# Patient Record
Sex: FEMALE | Race: WHITE | NOT HISPANIC OR LATINO | Employment: FULL TIME | URBAN - METROPOLITAN AREA
[De-identification: names, ages, dates, MRNs, and addresses within clinical notes are randomized per-mention and may not be internally consistent; named-entity substitution may affect disease eponyms.]

---

## 2018-02-16 ENCOUNTER — TELEPHONE (OUTPATIENT)
Dept: GASTROENTEROLOGY | Facility: CLINIC | Age: 33
End: 2018-02-16

## 2018-02-16 NOTE — TELEPHONE ENCOUNTER
Spoke with patient and advised patient we did receive her records and referral.    Our next available is late April early may.    Patient verbalizes understanding and is on the cancellation list if any cancellations appear.

## 2018-02-16 NOTE — TELEPHONE ENCOUNTER
----- Message from Tiff Roberts sent at 2/16/2018  8:24 AM CST -----  Contact: Pt #[phone]  Pt states she been calling and calling for (5) weeks to scheduled an Appt and would like to speak to the nurse #[phone]

## 2018-03-15 ENCOUNTER — TELEPHONE (OUTPATIENT)
Dept: GASTROENTEROLOGY | Facility: CLINIC | Age: 33
End: 2018-03-15

## 2018-03-15 NOTE — TELEPHONE ENCOUNTER
Attempted to contact patient without success.    Left message for patient to return call to the clinic to set up new patient appointment with Dr. Overton.

## 2018-03-15 NOTE — TELEPHONE ENCOUNTER
Spoke with patient and set up new patient appointment.    Patient confirmed appointment and reminder mailed out.

## 2018-04-09 ENCOUNTER — TELEPHONE (OUTPATIENT)
Dept: GASTROENTEROLOGY | Facility: CLINIC | Age: 33
End: 2018-04-09

## 2018-04-09 NOTE — TELEPHONE ENCOUNTER
----- Message from Veronica Bryant sent at 4/9/2018  3:10 PM CDT -----  Contact: pt#[phone]  Pt wants to know if medical records was received and also states that her appt is suppose to be with Dr Overton. Please call

## 2018-04-09 NOTE — PROGRESS NOTES
[facility] Consultation Note    Reason for Consult:    Chief Complaint   Patient presents with    Heartburn    Dysphagia    Emesis    Nausea    Abdominal Pain    Bloated    Gas    Diarrhea    Rectal Bleeding         PCP:   [facility] No   [address], [address] / [place] [address]    Referring MD:  Abdelrahman Yates Md  [address], [address]  [place] [address]      HPI:  Lou Lucio is a 33 y.o. female with a PMH of HTN referred to motility clinic for second opinion regarding the following problems:    GERD.  Patient reports bothersome heartburn  Retrosternal pyrosis:yes  Regurgitation:yes.   Onset: 2006 with worsening in 2012  Frequency: regurgitation daily; worse with bending over, pyrosis few days weekly  Improve with: h/o prevacid, prilosec lost efficacy, Dexilant x 2 weeks w/o improvement. Currently taking protonix 80 mg every am and Zantac 300 mg BID x 1 year. Some improvement after STRETTA (6/2017) on meds. Takes carafate few nights weekly with some improvement.  Upright symptoms: yes  Nocturnal symptoms:  yes  Hoarseness:yes  Cough:yes  Throat clearing:yes  Food Triggers:none in particular  Caffeine intake:avoids  Sleeps propped on two pillows  Avoids eating prior to bedtime.      Globus sensation. In throat. Constant. Since 6/2017 after STRETTA. Worse off PPI/H2RA. No dysphagia.     Nausea.  No early satiety  Frequency:several days weekly  Onset: 2012    Vomiting  Frequency:rare; was few days weekly in 1/2018 d/t off PPIs  Onset:2012  Timing of vomiting:  Within 30 min of eating   Within 3 hours after eating:        Cyclical episodes of n/v with symptom free intervals between episodes: 3-4 times in the last 2 years with 12-24 hours of n/v 2-3 times/12-15 diarrheaBMs/abd pain.last episode earlier this month.   History of migraines:yes. Well controlled with relpax PRN  Marijuana use:no  Unusual bathing behaviors:no    Some improvement with  protonix 80 mg daily and Zantac 300 mg BID  Has not tried zofran, phenergan, compazine, reglan, domperidone, scopolamine patch.    Abdominal pain. Reports abdominal pain. Was d/x with IBS in 2012. Treated for c diff in 2012  Character:cramping  Location:generalized  Frequency:  Few days weekly  Duration:30 min - 1 hour  Onset:5432-9654. Improvement in 2013 after d/c BC pills  Worse with:meals. Prior to diarrhea BM  Improves with:BM  Associated with Bm:yes  Nocturnal pain: rare  Some improvement with levsin PRN in 2012  Has not tried with Bentyl, Levbid, IBgurd.  Antidepressants:h/o amitriptyline x 6 weeks last year. D/c d/t sedation  Using narcotic pain medication: no     Gas and bloating.   Bloating: yes  Excessive gas: yes  Abdominal distension: no  Belching:yes   Symptoms get worse after meals:yes  Symptoms get worse as the day progresses: yes   Consumes lactose:yes  Consumes artificial sugars:no    Diarrhea. Intermittent. Few times monthly. 1-2 bristol type 6, or rare bristol type 7 BM/day. Really severe diarrhea x 3-4 in the last 2 years. Nocturnal stools during severe episodes. Stool seepage when passing gas 3-4 times when having diarrhea.  Some improvement with levsin or pepto bismol. Usually with Ambrose type 4 BM 3 times daily.    H/o c diff in 2012. After abx course for salmonella. Treated with PO abx. Resolved.     Blood in stool. Brbpr. Few days weekly since 2015. Small amt. coating outside of stool and on TP. feels external hemorrhoid at times.    Migraines. Takes relpax 40 mg PRN. Once to twice monthly. H/o Bell's palsy in 2013. treated with lyrica and steroids x few weeks. Seeing neurology since 2008.    Anxiety. Depression. Due to GI s/s. Also with stressful job. Has never seen psych.    Denies dysphagia,  early satiety, constipation,  melena, weight loss, trouble sleeping    Total visit time was 90 minutes, more than 50% of which was spent in face-to-face counseling with patient regarding symptoms,  diagnostic results, prognosis, risks and benefits of treatment options, instructions for management, importance of compliance with chosen treatment options, risk factor reduction, stress reduction, coping strategies.      Previous Studies:   EGD 6/9/17: NL esophagus. STRETTA. Small HH. NL stomach. NL duodenum.   EGD 2/17/17:ringed esophagus and feline appearence in middle third esophagus (no tissue). Diffuse mild gastric erythema (-). Nl duodenum.  Esophageal manometry 3/9/17: distal esophageal spasm, no EGJ outflow obstruction.  IRP NL 10.9; DCI NL 1840.2; DL -2.5?; 8% failed swallows, 8% pan esophageal pressurization, 58% premature contractions, no HH.  Personally reviewed by Dr. Overton   PH impedence off meds 3/9/17:  Elevated DeMester score (47.8) indicates severe reflux. 13.5% time in reflux: 24 % upright, 3.3 % supine, 13.5% postprandial.  SI/SAP: heartburn 100.0/99.9; CP 96.2/100.0; regur 96.4/100.0; belch 100.0/98.6;  100% proximal esophageal reflux.  Personally reviewed by Dr. Overton   *EGD/colon 2012:pt reports c.diff. normal procedures.    *per referring provider note/pt report      ROS:  ROS   Constitutional: No fevers, no chills, no night sweats, no weight loss  ENT: No congestion, + rhinorrhea, + chronic sinus problems  CV: No chest pain, no palpitations  Pulm: + cough, no shortness of breath  Ophtho: No blurry vision, no eye redness  GI: see HPI  Derm: No rash  Heme: No lymphadenopathy, no bruising  MSK: No arthritis, no joint swelling, no Raynauds  : No dysuria, + frequent urination, no blood in urine  Endo: No hot or cold intolerance  Neuro: No dizziness, no syncope, no seizure  Psych: No anxiety, no depression        Medical History:   Past Medical History:   Diagnosis Date    C. difficile colitis     GERD (gastroesophageal reflux disease)     Hypertension     Irritable bowel syndrome     Migraines         Surgical History:   Past Surgical History:   Procedure Laterality Date    COLONOSCOPY   "2012    ESOPHAGOGASTRODUODENOSCOPY  2017        Family History:   Family History   Problem Relation Age of Onset    Lung cancer Maternal Grandfather     Esophageal cancer Paternal Grandfather 50     smoker, drinker    Colon cancer Neg Hx     Crohn's disease Neg Hx     Inflammatory bowel disease Neg Hx     Irritable bowel syndrome Neg Hx     Liver cancer Neg Hx     Rectal cancer Neg Hx     Stomach cancer Neg Hx     Celiac disease Neg Hx         Social History:   Social History     Social History    Marital status:      Spouse name: N/A    Number of children: N/A    Years of education: N/A     Social History Main Topics    Smoking status: Never Smoker    Smokeless tobacco: Never Used    Alcohol use Yes      Comment: less than weekly    Drug use: No    Sexual activity: Not Asked     Other Topics Concern    None     Social History Narrative    None        Review of patient's allergies indicates:  Allergies not on file    Current Outpatient Prescriptions   Medication Sig Dispense Refill    BYSTOLIC 5 mg Tab TK 1 T PO QD IN THE EMEKA  5    CARAFATE 100 mg/mL suspension SHAKE LQ AND TK 10 ML PO QID PRN  1    eletriptan (RELPAX) 40 MG tablet Take 40 mg by mouth as needed. may repeat in 2 hours if necessary      ranitidine (ZANTAC) 150 MG tablet Take 150 mg by mouth 2 (two) times daily. Takes two twice daily      cholestyramine (QUESTRAN) 4 gram packet Take 1 packet (4 g total) by mouth 4 (four) times daily as needed. 120 packet 6    ondansetron (ZOFRAN) 8 MG tablet Take 1 tablet (8 mg total) by mouth every 8 (eight) hours as needed for Nausea. 90 tablet 3    pantoprazole (PROTONIX) 40 MG tablet Take 1 tablet (40 mg total) by mouth 2 (two) times daily. 60 tablet 5     No current facility-administered medications for this visit.         Objective Findings:  Vital Signs:  /88   Pulse 76   Ht 5' 6" (1.676 m)   Wt 94.8 kg (209 lb)   BMI 33.73 kg/m²   Body mass index is 33.73 " kg/m².    Physical Exam:  General appearance: alert, cooperative, no distress  HENT: Normocephalic, atraumatic, neck symmetrical, no nasal discharge  Eyes: conjunctivae/corneas clear, PERRL, EOM's intact  Lungs: clear to auscultation bilaterally, no dullness to percussion bilaterally  Heart: regular rate and rhythm without rub; no displacement of the PMI  Abdomen: soft, non-tender; bowel sounds normoactive; no organomegaly  Extremities: extremities symmetric; no clubbing, cyanosis, or edema  Integument: Skin color, texture, turgor normal; no rashes; hair distrubution normal  Neurologic: Alert and oriented X 3, normal strength, normal coordination and gait  Psychiatric: no pressured speech; normal affect; no evidence of impaired cognition    Labs:  Lab Results   Component Value Date    WBC 15.12 (H) 04/10/2018    HGB 13.6 04/10/2018    HCT 40.6 04/10/2018    MCV 91 04/10/2018     (H) 04/10/2018     No results found for: FERRITIN  Lab Results   Component Value Date     04/10/2018    K 3.8 04/10/2018     04/10/2018    CO2 25 04/10/2018    GLU 91 04/10/2018    BUN 10 04/10/2018    CREATININE 0.9 04/10/2018    CALCIUM 9.5 04/10/2018    PROT 7.2 04/10/2018    ALBUMIN 4.1 04/10/2018    BILITOT 0.6 04/10/2018    ALKPHOS 104 04/10/2018    AST 28 04/10/2018    ALT 30 04/10/2018     Lab Results   Component Value Date    TSH 2.313 04/10/2018     No results found for: SEDRATE  No results found for: CRP  No results found for: LABA1C, HGBA1C        Assessment and Plan:  Lou Lucio is a 33 y.o. female with a PMH of HTN referred to motility clinic for second opinion regarding the following problems:    GERD.  Hiatal hernia. Ph impedence off PPIs with severe reflux.  Stretta in 6/2017.   Very mild improvement after Stretta, unable to wean off PPI  Prevacid and omeprazole lost efficacy.  No improvement with Dexilant.  -Cont carafate PRN  -Protonix 40 mg BID.  -Zantac 300 mg PRN.  -pH impedence on PPI BID  -EGD w/  e/g/d biopsies and pH level   -Will consider checking gastrin pending above   -Will consider surgical referral for Nissen fundoplication     Globus sensation.  No dysphagia. Ringed esophagus on previous EGD.  Distal esophageal spasm    -EGD   -Esophageal manometry  -Check IgE level, CBC w diff    Nausea.  Rare vomiting. 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   -EGD  -Esophageal manometry r/o rumination  -Discussed etiology, pathophysiology, evaluation and treatment of rumination syndrome.  Provided handout     -GES  -Check labs  -Zofran 8 mg q8hr PRN    Abdominal pain. IBS diagnosed in 2012.  Unable to tolerate amitriptyline 10 mg daily x 6 weeks  Some improvement with levsin PRN  Has not tried Bentyl, Levbid, IBgurd.  -CT abd/pelvis  -Check labs    Recurrent episodes of nausea, vomiting, abdominal pain and severe diarrhea lasting a day (3-4 times in the past 2 years).  -Check LTS, lipase, US during episode    Gas and bloating. Distention. Belching.  Avoids artificial sugars.  -eliminate lactose  -Will consider SIBO treatment     Diarrhea. Few times monthly. Few episodes of severe diarrhea with nocturnal symptoms and incontinence.  -Questran 1 g QID PRN  -EGD w celiac bx   -Colonoscopy with r/l bx  -labs     H/o salmonella and c diff in 2012. Treated with PO abx with complete resolution    Blood in stool.   -Colonoscopy     Migraines.   Well controlled with relpax PRN.  Followed by neurology     Anxiety. Depression.  Stress.  Mostly related to GI symptoms. Works as an    Has never seen psychiatry nor psychology.   -Discussed the benefit of psychosocial therapy in management of functional GI disorders.  Provided handout.  -Referal to psychology (Dr. Regina Dash) for CBT/mindfulness based therapy/stress reduction training.     Follow-up in about 2 months (around 6/10/2018).    1. Gastroesophageal reflux disease without esophagitis    2. Nausea and vomiting, intractability of vomiting not  specified, unspecified vomiting type    3. Globus sensation    4. Generalized abdominal pain    5. Abdominal bloating    6. Intermittent diarrhea    7. BRBPR (bright red blood per rectum)    8. Anxiety and depression          Order summary:  Orders Placed This Encounter    NM Gastric Emptying    CT Abdomen Pelvis W Wo Contrast    CBC auto differential    Comprehensive metabolic panel    TSH    TISSUE TRANSGLUTAMINASE (TTG), IGA    IgA    IgE    Ambulatory Referral to Psychology    ondansetron (ZOFRAN) 8 MG tablet    cholestyramine (QUESTRAN) 4 gram packet    pantoprazole (PROTONIX) 40 MG tablet    Case request GI: Colonoscopy, ESOPHAGOGASTRODUODENOSCOPY (EGD)    Case request GI: 24 HOUR PH WITH IMPEDANCE (ON REFLUX MED), MANOMETRY-ESOPHAGEAL-WITH IMPEDANCE         Thank you so much for allowing me to participate in the care of ENRICO Kaur, FNP-C  Sirisha Overton MD

## 2018-04-09 NOTE — TELEPHONE ENCOUNTER
Spoke with patient and advised we did get her records.    Patient was also advised she will see both Andreea and Dr. Overton.    Eliel obtains the history and reviewing of records then Dr. Overton comes in and goes over everything and physical exam also plan of care.    Patient verbalizes understanding.

## 2018-04-10 ENCOUNTER — LAB VISIT (OUTPATIENT)
Dept: LAB | Facility: HOSPITAL | Age: 33
End: 2018-04-10
Payer: COMMERCIAL

## 2018-04-10 ENCOUNTER — OFFICE VISIT (OUTPATIENT)
Dept: GASTROENTEROLOGY | Facility: CLINIC | Age: 33
End: 2018-04-10
Payer: COMMERCIAL

## 2018-04-10 ENCOUNTER — TELEPHONE (OUTPATIENT)
Dept: ENDOSCOPY | Facility: HOSPITAL | Age: 33
End: 2018-04-10

## 2018-04-10 VITALS
HEIGHT: 66 IN | DIASTOLIC BLOOD PRESSURE: 88 MMHG | BODY MASS INDEX: 33.59 KG/M2 | WEIGHT: 209 LBS | HEART RATE: 76 BPM | SYSTOLIC BLOOD PRESSURE: 123 MMHG

## 2018-04-10 DIAGNOSIS — K21.9 GASTROESOPHAGEAL REFLUX DISEASE WITHOUT ESOPHAGITIS: ICD-10-CM

## 2018-04-10 DIAGNOSIS — K21.9 GASTROESOPHAGEAL REFLUX DISEASE WITHOUT ESOPHAGITIS: Primary | ICD-10-CM

## 2018-04-10 DIAGNOSIS — R19.7 INTERMITTENT DIARRHEA: ICD-10-CM

## 2018-04-10 DIAGNOSIS — R10.84 GENERALIZED ABDOMINAL PAIN: ICD-10-CM

## 2018-04-10 DIAGNOSIS — R09.A2 GLOBUS SENSATION: ICD-10-CM

## 2018-04-10 DIAGNOSIS — F32.A ANXIETY AND DEPRESSION: ICD-10-CM

## 2018-04-10 DIAGNOSIS — K62.5 BRBPR (BRIGHT RED BLOOD PER RECTUM): ICD-10-CM

## 2018-04-10 DIAGNOSIS — R11.2 NAUSEA AND VOMITING, INTRACTABILITY OF VOMITING NOT SPECIFIED, UNSPECIFIED VOMITING TYPE: ICD-10-CM

## 2018-04-10 DIAGNOSIS — R14.0 ABDOMINAL BLOATING: ICD-10-CM

## 2018-04-10 DIAGNOSIS — F41.9 ANXIETY AND DEPRESSION: ICD-10-CM

## 2018-04-10 LAB
ALBUMIN SERPL BCP-MCNC: 4.1 G/DL
ALP SERPL-CCNC: 104 U/L
ALT SERPL W/O P-5'-P-CCNC: 30 U/L
ANION GAP SERPL CALC-SCNC: 8 MMOL/L
AST SERPL-CCNC: 28 U/L
BASOPHILS # BLD AUTO: 0.08 K/UL
BASOPHILS NFR BLD: 0.5 %
BILIRUB SERPL-MCNC: 0.6 MG/DL
BUN SERPL-MCNC: 10 MG/DL
CALCIUM SERPL-MCNC: 9.5 MG/DL
CHLORIDE SERPL-SCNC: 105 MMOL/L
CO2 SERPL-SCNC: 25 MMOL/L
CREAT SERPL-MCNC: 0.9 MG/DL
DIFFERENTIAL METHOD: ABNORMAL
EOSINOPHIL # BLD AUTO: 1.2 K/UL
EOSINOPHIL NFR BLD: 8.1 %
ERYTHROCYTE [DISTWIDTH] IN BLOOD BY AUTOMATED COUNT: 12.2 %
EST. GFR  (AFRICAN AMERICAN): >60 ML/MIN/1.73 M^2
EST. GFR  (NON AFRICAN AMERICAN): >60 ML/MIN/1.73 M^2
GLUCOSE SERPL-MCNC: 91 MG/DL
HCT VFR BLD AUTO: 40.6 %
HGB BLD-MCNC: 13.6 G/DL
IGA SERPL-MCNC: 146 MG/DL
IGE SERPL-ACNC: 160 IU/ML
IMM GRANULOCYTES # BLD AUTO: 0.07 K/UL
IMM GRANULOCYTES NFR BLD AUTO: 0.5 %
LYMPHOCYTES # BLD AUTO: 3.1 K/UL
LYMPHOCYTES NFR BLD: 20.6 %
MCH RBC QN AUTO: 30.6 PG
MCHC RBC AUTO-ENTMCNC: 33.5 G/DL
MCV RBC AUTO: 91 FL
MONOCYTES # BLD AUTO: 0.8 K/UL
MONOCYTES NFR BLD: 5.2 %
NEUTROPHILS # BLD AUTO: 9.8 K/UL
NEUTROPHILS NFR BLD: 65.1 %
NRBC BLD-RTO: 0 /100 WBC
PLATELET # BLD AUTO: 439 K/UL
PMV BLD AUTO: 8.3 FL
POTASSIUM SERPL-SCNC: 3.8 MMOL/L
PROT SERPL-MCNC: 7.2 G/DL
RBC # BLD AUTO: 4.45 M/UL
SODIUM SERPL-SCNC: 138 MMOL/L
TSH SERPL DL<=0.005 MIU/L-ACNC: 2.31 UIU/ML
WBC # BLD AUTO: 15.12 K/UL

## 2018-04-10 PROCEDURE — 82784 ASSAY IGA/IGD/IGG/IGM EACH: CPT

## 2018-04-10 PROCEDURE — 83516 IMMUNOASSAY NONANTIBODY: CPT

## 2018-04-10 PROCEDURE — 80053 COMPREHEN METABOLIC PANEL: CPT

## 2018-04-10 PROCEDURE — 85025 COMPLETE CBC W/AUTO DIFF WBC: CPT

## 2018-04-10 PROCEDURE — 84443 ASSAY THYROID STIM HORMONE: CPT

## 2018-04-10 PROCEDURE — 99205 OFFICE O/P NEW HI 60 MIN: CPT | Mod: S$GLB,,, | Performed by: NURSE PRACTITIONER

## 2018-04-10 PROCEDURE — 36415 COLL VENOUS BLD VENIPUNCTURE: CPT

## 2018-04-10 PROCEDURE — 99999 PR PBB SHADOW E&M-EST. PATIENT-LVL V: CPT | Mod: PBBFAC,,, | Performed by: NURSE PRACTITIONER

## 2018-04-10 PROCEDURE — 82785 ASSAY OF IGE: CPT

## 2018-04-10 RX ORDER — CHOLESTYRAMINE 4 G/9G
4 POWDER, FOR SUSPENSION ORAL
Qty: 90 PACKET | Refills: 6 | Status: SHIPPED | OUTPATIENT
Start: 2018-04-10 | End: 2018-04-10 | Stop reason: SDUPTHER

## 2018-04-10 RX ORDER — PANTOPRAZOLE SODIUM 40 MG/1
TABLET, DELAYED RELEASE ORAL
Refills: 3 | COMMUNITY
Start: 2018-03-26 | End: 2018-04-10

## 2018-04-10 RX ORDER — ELETRIPTAN HYDROBROMIDE 40 MG/1
40 TABLET, FILM COATED ORAL
COMMUNITY
End: 2021-11-03

## 2018-04-10 RX ORDER — ONDANSETRON HYDROCHLORIDE 8 MG/1
8 TABLET, FILM COATED ORAL EVERY 8 HOURS PRN
Qty: 90 TABLET | Refills: 3 | Status: SHIPPED | OUTPATIENT
Start: 2018-04-10 | End: 2024-03-12

## 2018-04-10 RX ORDER — PANTOPRAZOLE SODIUM 40 MG/1
40 TABLET, DELAYED RELEASE ORAL 2 TIMES DAILY
Qty: 60 TABLET | Refills: 5 | Status: SHIPPED | OUTPATIENT
Start: 2018-04-10 | End: 2018-08-15

## 2018-04-10 RX ORDER — NEBIVOLOL HYDROCHLORIDE 5 MG/1
TABLET ORAL
Refills: 5 | COMMUNITY
Start: 2018-03-26

## 2018-04-10 RX ORDER — CHOLESTYRAMINE 4 G/9G
4 POWDER, FOR SUSPENSION ORAL 4 TIMES DAILY PRN
Qty: 120 PACKET | Refills: 6 | Status: SHIPPED | OUTPATIENT
Start: 2018-04-10 | End: 2018-07-05

## 2018-04-10 RX ORDER — SUCRALFATE 1 G/10ML
SUSPENSION ORAL
Refills: 1 | COMMUNITY
Start: 2018-02-23 | End: 2018-08-15

## 2018-04-10 NOTE — LETTER
April 10, 2018      Abdelrahman Yates MD  [address], [address]  [place] [address]           [facility]  [address]  [place] [address]  Phone: [phone]  Fax: [phone]          Patient: Lou Lucio   MR Number: [id number]   YOB: 1985   Date of Visit: 4/10/2018       Dear Dr. Abdelrahman Yates:    Thank you for referring Lou Lucio to me for evaluation. Attached you will find relevant portions of my assessment and plan of care.    If you have questions, please do not hesitate to call me. I look forward to following Lou Lucio along with you.    Sincerely,    Sirisha Overton MD    Enclosure  CC:  No Recipients    If you would like to receive this communication electronically, please contact [email or web address] or [phone] to request more information on [employer] Link access.    For providers and/or their staff who would like to refer a patient to [facility], please contact us through our one-stop-shop provider referral line, [facility], at [phone].    If you feel you have received this communication in error or would no longer like to receive these types of communications, please e-mail [email or web address]

## 2018-04-11 DIAGNOSIS — Z12.11 SPECIAL SCREENING FOR MALIGNANT NEOPLASMS, COLON: Primary | ICD-10-CM

## 2018-04-11 LAB — TTG IGA SER IA-ACNC: 3 UNITS

## 2018-04-11 RX ORDER — POLYETHYLENE GLYCOL 3350, SODIUM SULFATE ANHYDROUS, SODIUM BICARBONATE, SODIUM CHLORIDE, POTASSIUM CHLORIDE 236; 22.74; 6.74; 5.86; 2.97 G/4L; G/4L; G/4L; G/4L; G/4L
4 POWDER, FOR SOLUTION ORAL ONCE
Qty: 4000 ML | Refills: 0 | Status: SHIPPED | OUTPATIENT
Start: 2018-04-11 | End: 2018-04-11

## 2018-04-12 ENCOUNTER — TELEPHONE (OUTPATIENT)
Dept: GASTROENTEROLOGY | Facility: CLINIC | Age: 33
End: 2018-04-12

## 2018-04-14 ENCOUNTER — HOSPITAL ENCOUNTER (OUTPATIENT)
Dept: RADIOLOGY | Facility: HOSPITAL | Age: 33
Discharge: HOME | End: 2018-04-14
Attending: NURSE PRACTITIONER
Payer: COMMERCIAL

## 2018-04-14 DIAGNOSIS — K21.9 GASTROESOPHAGEAL REFLUX DISEASE WITHOUT ESOPHAGITIS: ICD-10-CM

## 2018-04-14 DIAGNOSIS — R14.0 ABDOMINAL BLOATING: ICD-10-CM

## 2018-04-14 DIAGNOSIS — R10.84 GENERALIZED ABDOMINAL PAIN: ICD-10-CM

## 2018-04-14 DIAGNOSIS — R11.2 NAUSEA AND VOMITING, INTRACTABILITY OF VOMITING NOT SPECIFIED, UNSPECIFIED VOMITING TYPE: ICD-10-CM

## 2018-04-14 PROCEDURE — 74177 CT ABD & PELVIS W/CONTRAST: CPT | Mod: 26,,, | Performed by: RADIOLOGY

## 2018-04-14 PROCEDURE — 74177 CT ABD & PELVIS W/CONTRAST: CPT | Mod: TC

## 2018-04-14 PROCEDURE — 25500020 PHARM REV CODE 255: Performed by: NURSE PRACTITIONER

## 2018-04-14 RX ADMIN — IOHEXOL 15 ML: 350 INJECTION, SOLUTION INTRAVENOUS at 02:04

## 2018-04-14 RX ADMIN — IOHEXOL 100 ML: 350 INJECTION, SOLUTION INTRAVENOUS at 03:04

## 2018-04-16 ENCOUNTER — TELEPHONE (OUTPATIENT)
Dept: GASTROENTEROLOGY | Facility: CLINIC | Age: 33
End: 2018-04-16

## 2018-04-16 NOTE — TELEPHONE ENCOUNTER
Spoke with patient and gave her the contact information for Dr. Dash's office to set up appointment.    Patient verbalizes understanding.

## 2018-04-16 NOTE — TELEPHONE ENCOUNTER
----- Message from Andreea Moreno NP sent at 4/13/2018  2:22 PM CDT -----  IgE elevated. Other labs fine.    Thanks,  Mimi, NP

## 2018-04-17 ENCOUNTER — TELEPHONE (OUTPATIENT)
Dept: GASTROENTEROLOGY | Facility: CLINIC | Age: 33
End: 2018-04-17

## 2018-04-17 DIAGNOSIS — K76.0 FATTY LIVER: Primary | ICD-10-CM

## 2018-04-17 NOTE — TELEPHONE ENCOUNTER
----- Message from Andreea Moreno NP sent at 4/17/2018  8:00 AM CDT -----  CT shows ovarian cysts. She needs to follow up with her OB/GYN regarding this finding. She has fatty liver. We have referred her to hepatology for further evaluation. There is also an appedicoliths (stones in appendix). We s/w the surgeon about this and he advises there is no need for urgent  surgery. He says appendectomy can be considered in the future if no other cause for her abdominal pain is found, but an appendectomy still may not fix her pain.      Thanks,  YASIR Le

## 2018-04-20 ENCOUNTER — TELEPHONE (OUTPATIENT)
Dept: GASTROENTEROLOGY | Facility: CLINIC | Age: 33
End: 2018-04-20

## 2018-04-20 NOTE — TELEPHONE ENCOUNTER
Spoke with patient and let her know the ct scan has been faxed.    Patient verbalizes understanding.

## 2018-04-20 NOTE — TELEPHONE ENCOUNTER
----- Message from Corina Castaneda sent at 4/19/2018  3:53 PM CDT -----  Contact: Self- [phone]  Tian- pt called to speak with Maira- would like to get her ct scan results faxed to Dr. Brito [phone]- her OBGYN- please call pt [phone]

## 2018-04-20 NOTE — TELEPHONE ENCOUNTER
Attempted to contact patient without success. Left message.    CT Scan faxed to Dr. Brito's office as requested by patient.

## 2018-04-30 ENCOUNTER — TELEPHONE (OUTPATIENT)
Dept: GASTROENTEROLOGY | Facility: CLINIC | Age: 33
End: 2018-04-30

## 2018-04-30 ENCOUNTER — ANESTHESIA (OUTPATIENT)
Dept: ENDOSCOPY | Facility: HOSPITAL | Age: 33
End: 2018-04-30
Payer: COMMERCIAL

## 2018-04-30 ENCOUNTER — HOSPITAL ENCOUNTER (OUTPATIENT)
Facility: HOSPITAL | Age: 33
Discharge: HOME | End: 2018-04-30
Attending: INTERNAL MEDICINE | Admitting: INTERNAL MEDICINE
Payer: COMMERCIAL

## 2018-04-30 ENCOUNTER — ANESTHESIA EVENT (OUTPATIENT)
Dept: ENDOSCOPY | Facility: HOSPITAL | Age: 33
End: 2018-04-30
Payer: COMMERCIAL

## 2018-04-30 VITALS
WEIGHT: 210 LBS | TEMPERATURE: 98 F | RESPIRATION RATE: 12 BRPM | DIASTOLIC BLOOD PRESSURE: 66 MMHG | HEIGHT: 66 IN | BODY MASS INDEX: 33.75 KG/M2 | HEART RATE: 60 BPM | OXYGEN SATURATION: 98 % | SYSTOLIC BLOOD PRESSURE: 107 MMHG

## 2018-04-30 DIAGNOSIS — R19.7 DIARRHEA: ICD-10-CM

## 2018-04-30 DIAGNOSIS — R19.7 DIARRHEA, UNSPECIFIED TYPE: Primary | ICD-10-CM

## 2018-04-30 LAB
B-HCG UR QL: NEGATIVE
CTP QC/QA: YES
PH, BODY FLUID: 4

## 2018-04-30 PROCEDURE — 43239 EGD BIOPSY SINGLE/MULTIPLE: CPT | Mod: 51,,, | Performed by: INTERNAL MEDICINE

## 2018-04-30 PROCEDURE — 83986 ASSAY PH BODY FLUID NOS: CPT | Performed by: INTERNAL MEDICINE

## 2018-04-30 PROCEDURE — 37000009 HC ANESTHESIA EA ADD 15 MINS: Performed by: INTERNAL MEDICINE

## 2018-04-30 PROCEDURE — D9220A PRA ANESTHESIA: Mod: ANES,,, | Performed by: ANESTHESIOLOGY

## 2018-04-30 PROCEDURE — 27201012 HC FORCEPS, HOT/COLD, DISP: Performed by: INTERNAL MEDICINE

## 2018-04-30 PROCEDURE — 81025 URINE PREGNANCY TEST: CPT | Performed by: INTERNAL MEDICINE

## 2018-04-30 PROCEDURE — 45380 COLONOSCOPY AND BIOPSY: CPT | Performed by: INTERNAL MEDICINE

## 2018-04-30 PROCEDURE — 37000008 HC ANESTHESIA 1ST 15 MINUTES: Performed by: INTERNAL MEDICINE

## 2018-04-30 PROCEDURE — 43239 EGD BIOPSY SINGLE/MULTIPLE: CPT | Performed by: INTERNAL MEDICINE

## 2018-04-30 PROCEDURE — 63600175 PHARM REV CODE 636 W HCPCS: Performed by: NURSE ANESTHETIST, CERTIFIED REGISTERED

## 2018-04-30 PROCEDURE — 25000003 PHARM REV CODE 250: Performed by: INTERNAL MEDICINE

## 2018-04-30 PROCEDURE — 45380 COLONOSCOPY AND BIOPSY: CPT | Mod: ,,, | Performed by: INTERNAL MEDICINE

## 2018-04-30 PROCEDURE — 88305 TISSUE EXAM BY PATHOLOGIST: CPT | Mod: 26,,, | Performed by: PATHOLOGY

## 2018-04-30 PROCEDURE — 88305 TISSUE EXAM BY PATHOLOGIST: CPT | Performed by: PATHOLOGY

## 2018-04-30 PROCEDURE — D9220A PRA ANESTHESIA: Mod: CRNA,,, | Performed by: NURSE ANESTHETIST, CERTIFIED REGISTERED

## 2018-04-30 RX ORDER — LIDOCAINE HCL/PF 100 MG/5ML
SYRINGE (ML) INTRAVENOUS
Status: DISCONTINUED | OUTPATIENT
Start: 2018-04-30 | End: 2018-04-30

## 2018-04-30 RX ORDER — PROPOFOL 10 MG/ML
VIAL (ML) INTRAVENOUS CONTINUOUS PRN
Status: DISCONTINUED | OUTPATIENT
Start: 2018-04-30 | End: 2018-04-30

## 2018-04-30 RX ORDER — PROPOFOL 10 MG/ML
VIAL (ML) INTRAVENOUS
Status: DISCONTINUED | OUTPATIENT
Start: 2018-04-30 | End: 2018-04-30

## 2018-04-30 RX ORDER — SODIUM CHLORIDE 9 MG/ML
INJECTION, SOLUTION INTRAVENOUS CONTINUOUS
Status: DISCONTINUED | OUTPATIENT
Start: 2018-04-30 | End: 2018-04-30 | Stop reason: HOSPADM

## 2018-04-30 RX ADMIN — PROPOFOL 50 MG: 10 INJECTION, EMULSION INTRAVENOUS at 02:04

## 2018-04-30 RX ADMIN — PROPOFOL 20 MG: 10 INJECTION, EMULSION INTRAVENOUS at 02:04

## 2018-04-30 RX ADMIN — SODIUM CHLORIDE: 9 INJECTION, SOLUTION INTRAVENOUS at 01:04

## 2018-04-30 RX ADMIN — PROPOFOL 200 MCG/KG/MIN: 10 INJECTION, EMULSION INTRAVENOUS at 02:04

## 2018-04-30 RX ADMIN — LIDOCAINE HYDROCHLORIDE 100 MG: 20 INJECTION, SOLUTION INTRAVENOUS at 02:04

## 2018-04-30 NOTE — INTERVAL H&P NOTE
The patient has been examined and the H&P has been reviewed:    I concur with the findings and no changes have occurred since H&P was written.    Anesthesia/Surgery risks, benefits and alternative options discussed and understood by patient/family.    Pre-Procedure H and P Addendum    Patient seen and examined.  History and exam unchanged from prior history and physical.      Procedure: EGD/colon  Indication: gerd, globus, nausea, diarrhea, BRBPR  ASA Class: per anesthesiology  Airway: per anesthesia  Neck Mobility: full range of motion  Mallampatti score: per anesthesia  History of anesthesia problems: no  Family history of anesthesia problems: no  Anesthesia Plan: per anesthesia        Active Hospital Problems    Diagnosis  POA    Diarrhea [R19.7]  Yes      Resolved Hospital Problems    Diagnosis Date Resolved POA   No resolved problems to display.

## 2018-04-30 NOTE — ANESTHESIA PREPROCEDURE EVALUATION
04/30/2018  Lou Lucio is a 33 y.o., female.  Pre-operative evaluation for Procedure(s) (LRB):  ESOPHAGOGASTRODUODENOSCOPY (EGD) (N/A)  Colonoscopy (N/A)    Lou Lucio is a 33 y.o. female     Patient Active Problem List   Diagnosis    Diarrhea       Review of patient's allergies indicates:  No Known Allergies    No current facility-administered medications on file prior to encounter.      Current Outpatient Prescriptions on File Prior to Encounter   Medication Sig Dispense Refill    BYSTOLIC 5 mg Tab TK 1 T PO QD IN THE EMEKA  5    CARAFATE 100 mg/mL suspension SHAKE LQ AND TK 10 ML PO QID PRN  1    eletriptan (RELPAX) 40 MG tablet Take 40 mg by mouth as needed. may repeat in 2 hours if necessary      ondansetron (ZOFRAN) 8 MG tablet Take 1 tablet (8 mg total) by mouth every 8 (eight) hours as needed for Nausea. 90 tablet 3    pantoprazole (PROTONIX) 40 MG tablet Take 1 tablet (40 mg total) by mouth 2 (two) times daily. 60 tablet 5    ranitidine (ZANTAC) 150 MG tablet Take 150 mg by mouth 2 (two) times daily. Takes two twice daily      cholestyramine (QUESTRAN) 4 gram packet Take 1 packet (4 g total) by mouth 4 (four) times daily as needed. 120 packet 6       Past Surgical History:   Procedure Laterality Date    COLONOSCOPY  2012    ESOPHAGOGASTRODUODENOSCOPY  2017       Social History     Social History    Marital status:      Spouse name: N/A    Number of children: N/A    Years of education: N/A     Occupational History    Not on file.     Social History Main Topics    Smoking status: Never Smoker    Smokeless tobacco: Never Used    Alcohol use Yes      Comment: less than weekly    Drug use: No    Sexual activity: Not on file     Other Topics Concern    Not on file     Social History Narrative    No narrative on file         CBC: No results for input(s): WBC, RBC, HGB, HCT, PLT,  "MCV, MCH, MCHC in the last 72 hours.    CMP: No results for input(s): NA, K, CL, CO2, BUN, CREATININE, GLU, MG, PHOS, CALCIUM, ALBUMIN, PROT, ALKPHOS, ALT, AST, BILITOT in the last 72 hours.    INR  No results for input(s): PT, INR, PROTIME, APTT in the last 72 hours.        Diagnostic Studies:      EKD Echo:  No results found for this or any previous visit.      Last 3 sets of Vitals    Vitals - 1 value per visit 4/10/2018 2018   SYSTOLIC 123 124   DIASTOLIC 88 74   PULSE 76 70   TEMPERATURE - 98.2   RESPIRATIONS - 14   SPO2 - 98   Weight (lb) 209 210   Weight (kg) 94.802 95.255   HEIGHT 5' 6" 5' 6"   BODY MASS INDEX 33.73 33.89   VISIT REPORT - -   Pain Score  0 -         Anesthesia Evaluation    I have reviewed the Patient Summary Reports.     I have reviewed the Medications.     Review of Systems  Social:  Non-Smoker, Social Alcohol Use    Hematology/Oncology:  Hematology Normal   Oncology Normal     EENT/Dental:EENT/Dental Normal   Cardiovascular:   Exercise tolerance: good Hypertension    Pulmonary:  Pulmonary Normal    Renal/:  Renal/ Normal     Hepatic/GI:   GERD    Musculoskeletal:  Musculoskeletal Normal    Neurological:   Headaches (Migraines)    Endocrine:  Endocrine Normal    Dermatological:  Skin Normal    Psych:  Psychiatric Normal           Physical Exam  General:  Well nourished    Airway/Jaw/Neck:  Airway Findings: Mouth Opening: Normal Tongue: Normal  Mallampati: II  TM Distance: Normal, at least 6 cm      Dental:  Dental Findings: In tact   Chest/Lungs:  Chest/Lungs Findings: Clear to auscultation, Normal Respiratory Rate     Heart/Vascular:  Heart Findings: Rate: Normal  Rhythm: Regular Rhythm        Mental Status:  Mental Status Findings:  Cooperative, Alert and Oriented         Anesthesia Plan  Type of Anesthesia, risks & benefits discussed:  Anesthesia Type:  general  Patient's Preference: same   Intra-op Monitoring Plan: standard ASA monitors  Intra-op Monitoring Plan " Comments:   Post Op Pain Control Plan: per primary service following discharge from PACU  Post Op Pain Control Plan Comments:   Induction:   IV  Beta Blocker:  Patient is not currently on a Beta-Blocker (No further documentation required).       Informed Consent: Patient understands risks and agrees with Anesthesia plan.  Questions answered. Anesthesia consent signed with patient.  ASA Score: 1     Day of Surgery Review of History & Physical:    H&P update referred to the surgeon.         Ready For Surgery From Anesthesia Perspective.

## 2018-04-30 NOTE — TELEPHONE ENCOUNTER
PT reports that she received a [employer] kit in the mail. I advised her to hold off until next visit and we will decide if still needed at that time

## 2018-04-30 NOTE — PROVATION PATIENT INSTRUCTIONS
Discharge Summary/Instructions after an Endoscopic Procedure  Patient Name: Lou Lucio  Patient MRN: [id number]  Patient YOB: 1985  Monday, April 30, 2018  Sirisha Overton MD  RESTRICTIONS:  During your procedure today, you received medications for sedation.  These   medications may affect your judgment, balance and coordination.  Therefore,   for 24 hours, you have the following restrictions:   - DO NOT drive a car, operate machinery, make legal/financial decisions,   sign important papers or drink alcohol.    ACTIVITY:  The following day: return to full activity including work, except no heavy   lifting, straining or running for 3 days if polyps were removed.  DIET:  Eat and drink normally unless instructed otherwise.     TREATMENT FOR COMMON SIDE EFFECTS:  - Mild abdominal pain, nausea, belching, bloating or excessive gas:  rest,   eat lightly and use a heating pad.  - Sore Throat: treat with throat lozenges and/or gargle with warm salt   water.  - Because air was used during the procedure, expelling large amounts of air   from your rectum or belching is normal.  - If a bowel prep was taken, you may not have a bowel movement for 1-3 days.    This is normal.  SYMPTOMS TO WATCH FOR AND REPORT TO YOUR PHYSICIAN:  1. Abdominal pain or bloating, other than gas cramps.  2. Chest pain.  3. Back pain.  4. Signs of infection such as: chills or fever occurring within 24 hours   after the procedure.  5. Rectal bleeding, which would show as bright red, maroon, or black stools.   (A tablespoon of blood from the rectum is not serious, especially if   hemorrhoids are present.)  6. Vomiting.  7. Weakness or dizziness.  GO DIRECTLY TO THE NEAREST EMERGENCY ROOM IF YOU HAVE ANY OF THE FOLLOWING:      Difficulty breathing              Chills and/or fever over 101 F   Persistent vomiting and/or vomiting blood   Severe abdominal pain   Severe chest pain   Black, tarry stools   Bleeding- more than one tablespoon   Any other  symptom or condition that you feel may need urgent attention  Your doctor recommends these additional instructions:  If any biopsies were taken, your doctors clinic will contact you in 1 to 2   weeks with any results.  - Await pathology results.   - Discharge patient to home (with escort).   - Resume previous diet.   - Continue present medications.   - Perform a colonoscopy today.   - The findings and recommendations were discussed with the patient.   - Patient has a contact number available for emergencies.  The signs and   symptoms of potential delayed complications were discussed with the   patient.  Return to normal activities tomorrow.  Written discharge   instructions were provided to the patient.   For questions, problems or results please call your physician - Sirisha Overton MD at Work:  [phone].  [facility], EMERGENCY ROOM PHONE NUMBER: [phone]  IF A COMPLICATION OR EMERGENCY SITUATION ARISES AND YOU ARE UNABLE TO REACH   YOUR PHYSICIAN - GO DIRECTLY TO THE EMERGENCY ROOM.  Sirisha Overton MD  4/30/2018 2:37:23 PM  This report has been verified and signed electronically.

## 2018-04-30 NOTE — PROVATION PATIENT INSTRUCTIONS
Discharge Summary/Instructions after an Endoscopic Procedure  Patient Name: Lou Lucio  Patient MRN: [id number]  Patient YOB: 1985  Monday, April 30, 2018  Sirisha Overton MD  RESTRICTIONS:  During your procedure today, you received medications for sedation.  These   medications may affect your judgment, balance and coordination.  Therefore,   for 24 hours, you have the following restrictions:   - DO NOT drive a car, operate machinery, make legal/financial decisions,   sign important papers or drink alcohol.    ACTIVITY:  The following day: return to full activity including work, except no heavy   lifting, straining or running for 3 days if polyps were removed.  DIET:  Eat and drink normally unless instructed otherwise.     TREATMENT FOR COMMON SIDE EFFECTS:  - Mild abdominal pain, nausea, belching, bloating or excessive gas:  rest,   eat lightly and use a heating pad.  - Sore Throat: treat with throat lozenges and/or gargle with warm salt   water.  - Because air was used during the procedure, expelling large amounts of air   from your rectum or belching is normal.  - If a bowel prep was taken, you may not have a bowel movement for 1-3 days.    This is normal.  SYMPTOMS TO WATCH FOR AND REPORT TO YOUR PHYSICIAN:  1. Abdominal pain or bloating, other than gas cramps.  2. Chest pain.  3. Back pain.  4. Signs of infection such as: chills or fever occurring within 24 hours   after the procedure.  5. Rectal bleeding, which would show as bright red, maroon, or black stools.   (A tablespoon of blood from the rectum is not serious, especially if   hemorrhoids are present.)  6. Vomiting.  7. Weakness or dizziness.  GO DIRECTLY TO THE NEAREST EMERGENCY ROOM IF YOU HAVE ANY OF THE FOLLOWING:      Difficulty breathing              Chills and/or fever over 101 F   Persistent vomiting and/or vomiting blood   Severe abdominal pain   Severe chest pain   Black, tarry stools   Bleeding- more than one tablespoon   Any other  symptom or condition that you feel may need urgent attention  Your doctor recommends these additional instructions:  If any biopsies were taken, your doctors clinic will contact you in 1 to 2   weeks with any results.  - Discharge patient to home (with escort).   - Resume previous diet.   - Continue present medications.   - Repeat colonoscopy at age 50 for screening purposes.   - Return to my office as previously scheduled.   - The findings and recommendations were discussed with the patient.   - Patient has a contact number available for emergencies.  The signs and   symptoms of potential delayed complications were discussed with the   patient.  Return to normal activities tomorrow.  Written discharge   instructions were provided to the patient.   For questions, problems or results please call your physician - Sirisha Overton MD at Work:  [phone].  [facility], EMERGENCY ROOM PHONE NUMBER: [phone]  IF A COMPLICATION OR EMERGENCY SITUATION ARISES AND YOU ARE UNABLE TO REACH   YOUR PHYSICIAN - GO DIRECTLY TO THE EMERGENCY ROOM.  Sirisha Overton MD  4/30/2018 2:59:20 PM  This report has been verified and signed electronically.

## 2018-04-30 NOTE — H&P (VIEW-ONLY)
[facility] Consultation Note    Reason for Consult:    Chief Complaint   Patient presents with    Heartburn    Dysphagia    Emesis    Nausea    Abdominal Pain    Bloated    Gas    Diarrhea    Rectal Bleeding         PCP:   Primary Doctor No   [address], [address] / [place] [address]    Referring MD:  Abdelrahman Yates Md  [address], [address]  [place] [address]      HPI:  Lou Lucio is a 33 y.o. female with a PMH of HTN referred to motility clinic for second opinion regarding the following problems:    GERD.  Patient reports bothersome heartburn  Retrosternal pyrosis:yes  Regurgitation:yes.   Onset: 2006 with worsening in 2012  Frequency: regurgitation daily; worse with bending over, pyrosis few days weekly  Improve with: h/o prevacid, prilosec lost efficacy, Dexilant x 2 weeks w/o improvement. Currently taking protonix 80 mg every am and Zantac 300 mg BID x 1 year. Some improvement after STRETTA (6/2017) on meds. Takes carafate few nights weekly with some improvement.  Upright symptoms: yes  Nocturnal symptoms:  yes  Hoarseness:yes  Cough:yes  Throat clearing:yes  Food Triggers:none in particular  Caffeine intake:avoids  Sleeps propped on two pillows  Avoids eating prior to bedtime.      Globus sensation. In throat. Constant. Since 6/2017 after STRETTA. Worse off PPI/H2RA. No dysphagia.     Nausea.  No early satiety  Frequency:several days weekly  Onset: 2012    Vomiting  Frequency:rare; was few days weekly in 1/2018 d/t off PPIs  Onset:2012  Timing of vomiting:  Within 30 min of eating   Within 3 hours after eating:        Cyclical episodes of n/v with symptom free intervals between episodes: 3-4 times in the last 2 years with 12-24 hours of n/v 2-3 times/12-15 diarrheaBMs/abd pain.last episode earlier this month.   History of migraines:yes. Well controlled with relpax PRN  Marijuana use:no  Unusual bathing behaviors:no    Some improvement with  protonix 80 mg daily and Zantac 300 mg BID  Has not tried zofran, phenergan, compazine, reglan, domperidone, scopolamine patch.    Abdominal pain. Reports abdominal pain. Was d/x with IBS in 2012. Treated for c diff in 2012  Character:cramping  Location:generalized  Frequency:  Few days weekly  Duration:30 min - 1 hour  Onset:3369-0547. Improvement in 2013 after d/c BC pills  Worse with:meals. Prior to diarrhea BM  Improves with:BM  Associated with Bm:yes  Nocturnal pain: rare  Some improvement with levsin PRN in 2012  Has not tried with Bentyl, Levbid, IBgurd.  Antidepressants:h/o amitriptyline x 6 weeks last year. D/c d/t sedation  Using narcotic pain medication: no     Gas and bloating.   Bloating: yes  Excessive gas: yes  Abdominal distension: no  Belching:yes   Symptoms get worse after meals:yes  Symptoms get worse as the day progresses: yes   Consumes lactose:yes  Consumes artificial sugars:no    Diarrhea. Intermittent. Few times monthly. 1-2 bristol type 6, or rare bristol type 7 BM/day. Really severe diarrhea x 3-4 in the last 2 years. Nocturnal stools during severe episodes. Stool seepage when passing gas 3-4 times when having diarrhea.  Some improvement with levsin or pepto bismol. Usually with [place] type 4 BM 3 times daily.    H/o c diff in 2012. After abx course for salmonella. Treated with PO abx. Resolved.     Blood in stool. Brbpr. Few days weekly since 2015. Small amt. coating outside of stool and on TP. feels external hemorrhoid at times.    Migraines. Takes relpax 40 mg PRN. Once to twice monthly. H/o Bell's palsy in 2013. treated with lyrica and steroids x few weeks. Seeing neurology since 2008.    Anxiety. Depression. Due to GI s/s. Also with stressful job. Has never seen psych.    Denies dysphagia,  early satiety, constipation,  melena, weight loss, trouble sleeping    Total visit time was 90 minutes, more than 50% of which was spent in face-to-face counseling with patient regarding symptoms,  diagnostic results, prognosis, risks and benefits of treatment options, instructions for management, importance of compliance with chosen treatment options, risk factor reduction, stress reduction, coping strategies.      Previous Studies:   EGD 6/9/17: NL esophagus. STRETTA. Small HH. NL stomach. NL duodenum.   EGD 2/17/17:ringed esophagus and feline appearence in middle third esophagus (no tissue). Diffuse mild gastric erythema (-). Nl duodenum.  Esophageal manometry 3/9/17: distal esophageal spasm, no EGJ outflow obstruction.  IRP NL 10.9; DCI NL 1840.2; DL -2.5?; 8% failed swallows, 8% pan esophageal pressurization, 58% premature contractions, no HH.  Personally reviewed by Dr. Overton   PH impedence off meds 3/9/17:  Elevated DeMester score (47.8) indicates severe reflux. 13.5% time in reflux: 24 % upright, 3.3 % supine, 13.5% postprandial.  SI/SAP: heartburn 100.0/99.9; CP 96.2/100.0; regur 96.4/100.0; belch 100.0/98.6;  100% proximal esophageal reflux.  Personally reviewed by Dr. Overton   *EGD/colon 2012:pt reports c.diff. normal procedures.    *per referring provider note/pt report      ROS:  ROS   Constitutional: No fevers, no chills, no night sweats, no weight loss  ENT: No congestion, + rhinorrhea, + chronic sinus problems  CV: No chest pain, no palpitations  Pulm: + cough, no shortness of breath  Ophtho: No blurry vision, no eye redness  GI: see HPI  Derm: No rash  Heme: No lymphadenopathy, no bruising  MSK: No arthritis, no joint swelling, no Raynauds  : No dysuria, + frequent urination, no blood in urine  Endo: No hot or cold intolerance  Neuro: No dizziness, no syncope, no seizure  Psych: No anxiety, no depression        Medical History:   Past Medical History:   Diagnosis Date    C. difficile colitis     GERD (gastroesophageal reflux disease)     Hypertension     Irritable bowel syndrome     Migraines         Surgical History:   Past Surgical History:   Procedure Laterality Date    COLONOSCOPY   "2012    ESOPHAGOGASTRODUODENOSCOPY  2017        Family History:   Family History   Problem Relation Age of Onset    Lung cancer Maternal Grandfather     Esophageal cancer Paternal Grandfather 50     smoker, drinker    Colon cancer Neg Hx     Crohn's disease Neg Hx     Inflammatory bowel disease Neg Hx     Irritable bowel syndrome Neg Hx     Liver cancer Neg Hx     Rectal cancer Neg Hx     Stomach cancer Neg Hx     Celiac disease Neg Hx         Social History:   Social History     Social History    Marital status:      Spouse name: N/A    Number of children: N/A    Years of education: N/A     Social History Main Topics    Smoking status: Never Smoker    Smokeless tobacco: Never Used    Alcohol use Yes      Comment: less than weekly    Drug use: No    Sexual activity: Not Asked     Other Topics Concern    None     Social History Narrative    None        Review of patient's allergies indicates:  Allergies not on file    Current Outpatient Prescriptions   Medication Sig Dispense Refill    BYSTOLIC 5 mg Tab TK 1 T PO QD IN THE EMEKA  5    CARAFATE 100 mg/mL suspension SHAKE LQ AND TK 10 ML PO QID PRN  1    eletriptan (RELPAX) 40 MG tablet Take 40 mg by mouth as needed. may repeat in 2 hours if necessary      ranitidine (ZANTAC) 150 MG tablet Take 150 mg by mouth 2 (two) times daily. Takes two twice daily      cholestyramine (QUESTRAN) 4 gram packet Take 1 packet (4 g total) by mouth 4 (four) times daily as needed. 120 packet 6    ondansetron (ZOFRAN) 8 MG tablet Take 1 tablet (8 mg total) by mouth every 8 (eight) hours as needed for Nausea. 90 tablet 3    pantoprazole (PROTONIX) 40 MG tablet Take 1 tablet (40 mg total) by mouth 2 (two) times daily. 60 tablet 5     No current facility-administered medications for this visit.         Objective Findings:  Vital Signs:  /88   Pulse 76   Ht 5' 6" (1.676 m)   Wt 94.8 kg (209 lb)   BMI 33.73 kg/m²   Body mass index is 33.73 " kg/m².    Physical Exam:  General appearance: alert, cooperative, no distress  HENT: Normocephalic, atraumatic, neck symmetrical, no nasal discharge  Eyes: conjunctivae/corneas clear, PERRL, EOM's intact  Lungs: clear to auscultation bilaterally, no dullness to percussion bilaterally  Heart: regular rate and rhythm without rub; no displacement of the PMI  Abdomen: soft, non-tender; bowel sounds normoactive; no organomegaly  Extremities: extremities symmetric; no clubbing, cyanosis, or edema  Integument: Skin color, texture, turgor normal; no rashes; hair distrubution normal  Neurologic: Alert and oriented X 3, normal strength, normal coordination and gait  Psychiatric: no pressured speech; normal affect; no evidence of impaired cognition    Labs:  Lab Results   Component Value Date    WBC 15.12 (H) 04/10/2018    HGB 13.6 04/10/2018    HCT 40.6 04/10/2018    MCV 91 04/10/2018     (H) 04/10/2018     No results found for: FERRITIN  Lab Results   Component Value Date     04/10/2018    K 3.8 04/10/2018     04/10/2018    CO2 25 04/10/2018    GLU 91 04/10/2018    BUN 10 04/10/2018    CREATININE 0.9 04/10/2018    CALCIUM 9.5 04/10/2018    PROT 7.2 04/10/2018    ALBUMIN 4.1 04/10/2018    BILITOT 0.6 04/10/2018    ALKPHOS 104 04/10/2018    AST 28 04/10/2018    ALT 30 04/10/2018     Lab Results   Component Value Date    TSH 2.313 04/10/2018     No results found for: SEDRATE  No results found for: CRP  No results found for: LABA1C, HGBA1C        Assessment and Plan:  Lou Lucio is a 33 y.o. female with a PMH of HTN referred to motility clinic for second opinion regarding the following problems:    GERD.  Hiatal hernia. Ph impedence off PPIs with severe reflux.  Stretta in 6/2017.   Very mild improvement after Stretta, unable to wean off PPI  Prevacid and omeprazole lost efficacy.  No improvement with Dexilant.  -Cont carafate PRN  -Protonix 40 mg BID.  -Zantac 300 mg PRN.  -pH impedence on PPI BID  -EGD w/  e/g/d biopsies and pH level   -Will consider checking gastrin pending above   -Will consider surgical referral for Nissen fundoplication     Globus sensation.  No dysphagia. Ringed esophagus on previous EGD.  Distal esophageal spasm    -EGD   -Esophageal manometry  -Check IgE level, CBC w diff    Nausea.  Rare vomiting. 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   -EGD  -Esophageal manometry r/o rumination  -Discussed etiology, pathophysiology, evaluation and treatment of rumination syndrome.  Provided handout     -GES  -Check labs  -Zofran 8 mg q8hr PRN    Abdominal pain. IBS diagnosed in 2012.  Unable to tolerate amitriptyline 10 mg daily x 6 weeks  Some improvement with levsin PRN  Has not tried Bentyl, Levbid, IBgurd.  -CT abd/pelvis  -Check labs    Recurrent episodes of nausea, vomiting, abdominal pain and severe diarrhea lasting a day (3-4 times in the past 2 years).  -Check LTS, lipase, US during episode    Gas and bloating. Distention. Belching.  Avoids artificial sugars.  -eliminate lactose  -Will consider SIBO treatment     Diarrhea. Few times monthly. Few episodes of severe diarrhea with nocturnal symptoms and incontinence.  -Questran 1 g QID PRN  -EGD w celiac bx   -Colonoscopy with r/l bx  -labs     H/o salmonella and c diff in 2012. Treated with PO abx with complete resolution    Blood in stool.   -Colonoscopy     Migraines.   Well controlled with relpax PRN.  Followed by neurology     Anxiety. Depression.  Stress.  Mostly related to GI symptoms. Works as an    Has never seen psychiatry nor psychology.   -Discussed the benefit of psychosocial therapy in management of functional GI disorders.  Provided handout.  -Referal to psychology (Dr. Regina Dash) for CBT/mindfulness based therapy/stress reduction training.     Follow-up in about 2 months (around 6/10/2018).    1. Gastroesophageal reflux disease without esophagitis    2. Nausea and vomiting, intractability of vomiting not  specified, unspecified vomiting type    3. Globus sensation    4. Generalized abdominal pain    5. Abdominal bloating    6. Intermittent diarrhea    7. BRBPR (bright red blood per rectum)    8. Anxiety and depression          Order summary:  Orders Placed This Encounter    NM Gastric Emptying    CT Abdomen Pelvis W Wo Contrast    CBC auto differential    Comprehensive metabolic panel    TSH    TISSUE TRANSGLUTAMINASE (TTG), IGA    IgA    IgE    Ambulatory Referral to Psychology    ondansetron (ZOFRAN) 8 MG tablet    cholestyramine (QUESTRAN) 4 gram packet    pantoprazole (PROTONIX) 40 MG tablet    Case request GI: Colonoscopy, ESOPHAGOGASTRODUODENOSCOPY (EGD)    Case request GI: 24 HOUR PH WITH IMPEDANCE (ON REFLUX MED), MANOMETRY-ESOPHAGEAL-WITH IMPEDANCE         Thank you so much for allowing me to participate in the care of ENRICO Kaur, FNP-C  Sirisha Overton MD

## 2018-04-30 NOTE — TRANSFER OF CARE
"Anesthesia Transfer of Care Note    Patient: Lou Lucio    Procedure(s) Performed: Procedure(s) (LRB):  ESOPHAGOGASTRODUODENOSCOPY (EGD) (N/A)  Colonoscopy (N/A)    Patient location: PACU    Anesthesia Type: general    Transport from OR: Transported from OR on room air with adequate spontaneous ventilation    Post pain: adequate analgesia    Post assessment: no apparent anesthetic complications    Post vital signs: stable    Level of consciousness: sedated    Nausea/Vomiting: no nausea/vomiting    Complications: none    Transfer of care protocol was followed      Last vitals:   Visit Vitals  /74   Pulse 70   Temp 36.8 °C (98.2 °F)   Resp 14   Ht 5' 6" (1.676 m)   Wt 95.3 kg (210 lb)   SpO2 98%   Breastfeeding? No   BMI 33.89 kg/m²     "

## 2018-05-01 NOTE — ANESTHESIA POSTPROCEDURE EVALUATION
"Anesthesia Post Evaluation    Patient: Lou Lucio    Procedure(s) Performed: Procedure(s) (LRB):  ESOPHAGOGASTRODUODENOSCOPY (EGD) (N/A)  Colonoscopy (N/A)    Final Anesthesia Type: general  Patient location during evaluation: GI PACU  Patient participation: Yes- Able to Participate  Level of consciousness: awake and alert  Post-procedure vital signs: reviewed and stable  Pain management: adequate  Airway patency: patent  PONV status at discharge: No PONV  Anesthetic complications: no      Cardiovascular status: blood pressure returned to baseline  Respiratory status: unassisted, spontaneous ventilation and room air  Hydration status: euvolemic          Visit Vitals  /66 (BP Location: Left arm, Patient Position: Sitting)   Pulse 60   Temp 36.4 °C (97.5 °F) (Oral)   Resp 12   Ht 5' 6" (1.676 m)   Wt 95.3 kg (210 lb)   SpO2 98%   Breastfeeding? No   BMI 33.89 kg/m²       Pain/Lucia Score: Pain Assessment Performed: Yes (4/30/2018  3:40 PM)  Presence of Pain: denies (4/30/2018  3:40 PM)  Pain Rating Prior to Med Admin: 0 (4/30/2018  3:40 PM)  Lucia Score: 10 (4/30/2018  3:40 PM)      "

## 2018-05-04 ENCOUNTER — HOSPITAL ENCOUNTER (OUTPATIENT)
Dept: RADIOLOGY | Facility: HOSPITAL | Age: 33
Discharge: HOME | End: 2018-05-04
Attending: NURSE PRACTITIONER
Payer: COMMERCIAL

## 2018-05-04 DIAGNOSIS — K21.9 GASTROESOPHAGEAL REFLUX DISEASE WITHOUT ESOPHAGITIS: ICD-10-CM

## 2018-05-04 DIAGNOSIS — R14.0 ABDOMINAL BLOATING: ICD-10-CM

## 2018-05-04 DIAGNOSIS — R10.84 GENERALIZED ABDOMINAL PAIN: ICD-10-CM

## 2018-05-04 DIAGNOSIS — R11.2 NAUSEA AND VOMITING, INTRACTABILITY OF VOMITING NOT SPECIFIED, UNSPECIFIED VOMITING TYPE: ICD-10-CM

## 2018-05-04 PROCEDURE — 78264 GASTRIC EMPTYING IMG STUDY: CPT | Mod: TC

## 2018-05-04 PROCEDURE — A9541 TC99M SULFUR COLLOID: HCPCS

## 2018-05-04 PROCEDURE — 78264 GASTRIC EMPTYING IMG STUDY: CPT | Mod: 26,,, | Performed by: RADIOLOGY

## 2018-05-07 ENCOUNTER — PATIENT MESSAGE (OUTPATIENT)
Dept: ENDOSCOPY | Facility: HOSPITAL | Age: 33
End: 2018-05-07

## 2018-05-07 ENCOUNTER — TELEPHONE (OUTPATIENT)
Dept: ENDOSCOPY | Facility: HOSPITAL | Age: 33
End: 2018-05-07

## 2018-05-17 ENCOUNTER — SURGERY (OUTPATIENT)
Age: 33
End: 2018-05-17

## 2018-05-17 ENCOUNTER — HOSPITAL ENCOUNTER (OUTPATIENT)
Facility: HOSPITAL | Age: 33
Discharge: HOME | End: 2018-05-17
Attending: INTERNAL MEDICINE | Admitting: INTERNAL MEDICINE
Payer: COMMERCIAL

## 2018-05-17 ENCOUNTER — TELEPHONE (OUTPATIENT)
Dept: GASTROENTEROLOGY | Facility: CLINIC | Age: 33
End: 2018-05-17

## 2018-05-17 VITALS
BODY MASS INDEX: 33.75 KG/M2 | HEIGHT: 66 IN | HEART RATE: 88 BPM | TEMPERATURE: 99 F | WEIGHT: 210 LBS | DIASTOLIC BLOOD PRESSURE: 83 MMHG | OXYGEN SATURATION: 98 % | RESPIRATION RATE: 20 BRPM | SYSTOLIC BLOOD PRESSURE: 136 MMHG

## 2018-05-17 DIAGNOSIS — R13.10 DYSPHAGIA: ICD-10-CM

## 2018-05-17 PROCEDURE — 91010 ESOPHAGUS MOTILITY STUDY: CPT | Mod: 26,,, | Performed by: INTERNAL MEDICINE

## 2018-05-17 PROCEDURE — 25000003 PHARM REV CODE 250: Performed by: INTERNAL MEDICINE

## 2018-05-17 PROCEDURE — 91037 ESOPH IMPED FUNCTION TEST: CPT | Mod: 26,,, | Performed by: INTERNAL MEDICINE

## 2018-05-17 PROCEDURE — 91010 ESOPHAGUS MOTILITY STUDY: CPT | Performed by: INTERNAL MEDICINE

## 2018-05-17 PROCEDURE — 91038 ESOPH IMPED FUNCT TEST > 1HR: CPT | Performed by: INTERNAL MEDICINE

## 2018-05-17 RX ORDER — LIDOCAINE HYDROCHLORIDE 20 MG/ML
JELLY TOPICAL ONCE
Status: COMPLETED | OUTPATIENT
Start: 2018-05-17 | End: 2018-05-17

## 2018-05-17 RX ADMIN — LIDOCAINE HYDROCHLORIDE 10 ML: 20 JELLY TOPICAL at 09:05

## 2018-05-18 ENCOUNTER — OFFICE VISIT (OUTPATIENT)
Dept: HEPATOLOGY | Facility: CLINIC | Age: 33
End: 2018-05-18
Payer: COMMERCIAL

## 2018-05-18 ENCOUNTER — PROCEDURE VISIT (OUTPATIENT)
Dept: HEPATOLOGY | Facility: CLINIC | Age: 33
End: 2018-05-18
Attending: PHYSICIAN ASSISTANT
Payer: COMMERCIAL

## 2018-05-18 VITALS
HEART RATE: 75 BPM | BODY MASS INDEX: 33.62 KG/M2 | TEMPERATURE: 97 F | WEIGHT: 209.19 LBS | SYSTOLIC BLOOD PRESSURE: 129 MMHG | OXYGEN SATURATION: 100 % | HEIGHT: 66 IN | DIASTOLIC BLOOD PRESSURE: 84 MMHG | RESPIRATION RATE: 18 BRPM

## 2018-05-18 DIAGNOSIS — K76.0 HEPATIC STEATOSIS: ICD-10-CM

## 2018-05-18 DIAGNOSIS — K76.0 HEPATIC STEATOSIS: Primary | ICD-10-CM

## 2018-05-18 PROCEDURE — 99204 OFFICE O/P NEW MOD 45 MIN: CPT | Mod: S$GLB,,, | Performed by: PHYSICIAN ASSISTANT

## 2018-05-18 PROCEDURE — 3008F BODY MASS INDEX DOCD: CPT | Mod: CPTII,S$GLB,, | Performed by: PHYSICIAN ASSISTANT

## 2018-05-18 PROCEDURE — 99999 PR PBB SHADOW E&M-EST. PATIENT-LVL IV: CPT | Mod: PBBFAC,,, | Performed by: PHYSICIAN ASSISTANT

## 2018-05-18 PROCEDURE — 91200 LIVER ELASTOGRAPHY: CPT | Mod: S$GLB,,, | Performed by: PHYSICIAN ASSISTANT

## 2018-05-18 NOTE — LETTER
May 18, 2018      Andreea Moreno, YASIR  [address]  [facility] [address]           [facility]  [address]  [facility] [address]  Phone: [phone]  Fax: [phone]          Patient: Lou Lucio   MR Number: [id number]   YOB: 1985   Date of Visit: 5/18/2018       Dear Andreea Moreno:    Thank you for referring Lou Lucio to me for evaluation. Attached you will find relevant portions of my assessment and plan of care.    If you have questions, please do not hesitate to call me. I look forward to following Lou Lucio along with you.    Sincerely,    Shant Barney PA-C    Enclosure  CC:  No Recipients    If you would like to receive this communication electronically, please contact [email or web address] or [phone] to request more information on [employer] access.    For providers and/or their staff who would like to refer a patient to [facility], please contact us through our one-stop-shop provider referral line, [facility] , at [phone].    If you feel you have received this communication in error or would no longer like to receive these types of communications, please e-mail [email or web address]

## 2018-05-18 NOTE — [employer] PATIENT INSTRUCTIONS
Discharge Summary/Instructions after an Endoscopic Procedure  Patient Name: Lou Lucio  Patient MRN: [id number]  Patient YOB: 1985  Thursday, May 17, 2018  Sirisha Overton MD  RESTRICTIONS:  During your procedure today, you received medications for sedation.  These   medications may affect your judgment, balance and coordination.  Therefore,   for 24 hours, you have the following restrictions:   - DO NOT drive a car, operate machinery, make legal/financial decisions,   sign important papers or drink alcohol.    ACTIVITY:  The following day: return to full activity including work, except no heavy   lifting, straining or running for 3 days if polyps were removed.  DIET:  Eat and drink normally unless instructed otherwise.     TREATMENT FOR COMMON SIDE EFFECTS:  - Mild abdominal pain, nausea, belching, bloating or excessive gas:  rest,   eat lightly and use a heating pad.  - Sore Throat: treat with throat lozenges and/or gargle with warm salt   water.  - Because air was used during the procedure, expelling large amounts of air   from your rectum or belching is normal.  - If a bowel prep was taken, you may not have a bowel movement for 1-3 days.    This is normal.  SYMPTOMS TO WATCH FOR AND REPORT TO YOUR PHYSICIAN:  1. Abdominal pain or bloating, other than gas cramps.  2. Chest pain.  3. Back pain.  4. Signs of infection such as: chills or fever occurring within 24 hours   after the procedure.  5. Rectal bleeding, which would show as bright red, maroon, or black stools.   (A tablespoon of blood from the rectum is not serious, especially if   hemorrhoids are present.)  6. Vomiting.  7. Weakness or dizziness.  GO DIRECTLY TO THE NEAREST EMERGENCY ROOM IF YOU HAVE ANY OF THE FOLLOWING:      Difficulty breathing              Chills and/or fever over 101 F   Persistent vomiting and/or vomiting blood   Severe abdominal pain   Severe chest pain   Black, tarry stools   Bleeding- more than one tablespoon   Any other  symptom or condition that you feel may need urgent attention  Your doctor recommends these additional instructions:  If any biopsies were taken, your doctors clinic will contact you in 1 to 2   weeks with any results.  - Return to my office as previously scheduled.   - Discharge patient to home (ambulatory).  For questions, problems or results please call your physician - Sirisha Overton MD at Work:  [phone].  [facility], EMERGENCY ROOM PHONE NUMBER: [phone]  IF A COMPLICATION OR EMERGENCY SITUATION ARISES AND YOU ARE UNABLE TO REACH   YOUR PHYSICIAN - GO DIRECTLY TO THE EMERGENCY ROOM.  Sirisha Overton MD  5/17/2018 7:25:12 PM  This report has been verified and signed electronically.  [employer]

## 2018-05-18 NOTE — PROGRESS NOTES
I have personally performed a face to face diagnostic evaluation on this patient. I have reviewed and agree with today's findings and the care plan outlined by Shant Barney PA-C      My findings are as follows:  Patient presents with likely NAFLD    - discussed weight loss and high protein diet.  - fibroscan    she will return to Shant Barney PA-C  for follow-up.

## 2018-05-18 NOTE — PROCEDURES
Procedures   Fibroscan Procedure     Name: Lou Lucio  Date of Procedure : 2018   :: Shant Barney PA-C  Diagnosis: NAFLD    Probe: XL    Fibroscan readin.1 KPa    Fibrosis:F 0-1     CAP readin dB/m    Steatosis: :S3

## 2018-05-18 NOTE — TELEPHONE ENCOUNTER
Spoke with patient and gave her the results from manometry test.    Patient verbalizes understanding.    Patient is scheduled with SARAH Moreno 6/14 and Dr. celaya 7/17.    Patient confirmed appointments for both days

## 2018-05-18 NOTE — TELEPHONE ENCOUNTER
Manometry   No [place] Classification Abnormality   Complete bolus clearance  No Hiatal hernia   Normal multiple rapid swallows test  No signioficant changes with provocative maneuvers  Aerphagia  Rumination Syndrome     Please let pt know that her manometry shows normal esophageal function, but evidence of rumination episodes.  I would like for her to see T Josh to go over rumination syndrome and practice diaphragmatic breathing.  Follow up with me within a month of that apt

## 2018-05-18 NOTE — PROGRESS NOTES
HEPATOLOGY CLINIC VISIT NOTE     REFERRING PROVIDER: Andreea Moreno    REASON FOR VISIT: fatty liver     HISTORY: This is a 33 y.o. White female here for evaluation of fatty liver noted on CT for evaluation of abdominal pain. She has essentially normal transaminases. She has normal synthetic liver function. Her PLTs are elevated.     PMH is significant for dysphagia and reflux. 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 She is very distressed by her reflux and vomiting. She reports carbs are well tolerated in her diet but vegetables cause stomach upset. She denies FH of liver disease. She denies drug use. She reports variable social alcohol use but reports minimal lately due to reflux. She has not had formal fibrosis staging.     Liver staging:  No formal staging  Normal transaminases  Normal LFTs  PLTS >150     Denies jaundice, dark urine, abdominal distention, hematemesis, melena, slowed mentation.   No abnormal skin rashes. No generalized joint pain.                   Past Medical History:   Diagnosis Date    C. difficile colitis     GERD (gastroesophageal reflux disease)     Hypertension     Irritable bowel syndrome     Migraines      Past Surgical History:   Procedure Laterality Date    COLONOSCOPY  2012    COLONOSCOPY N/A 4/30/2018    Procedure: Colonoscopy;  Surgeon: Sirisha Overton MD;  Location: [address]);  Service: Endoscopy;  Laterality: N/A;  PM prep    ESOPHAGOGASTRODUODENOSCOPY  2017     FAMILY HISTORY: Negative for liver disease    SOCIAL HISTORY:       History   Smoking Status    Never Smoker   Smokeless Tobacco    Never Used       History   Alcohol Use    Yes     Comment: less than weekly   socially, 3-4 nights per week; 1 glass or bottle depending on event- wine    History   Drug Use No       ROS:   No fever, chills, weight loss  No chest pain, palpitations, dyspnea, cough  (+) abdominal pain, change in bowel pattern,  (+)nausea, vomiting  (+) headaches, visual changes  No depression or anxiety      PHYSICAL EXAM:  Friendly White female, in no acute distress; alert and oriented to person, place and time  VITALS: reviewed  HEENT: Sclerae anicteric.   NECK: Supple  CVS: Regular rate and rhythm. No murmurs  LUNGS: Normal respiratory effort. Clear bilaterally  ABDOMEN: Flat, soft, nontender. No organomegaly or masses. No ascites or hernias  SKIN: Warm and dry. No jaundice, No obvious rashes.   EXTREMITIES: No lower extremity edema  NEURO/PSYCH: Normal gate. Memory intact. Thought and speech pattern appropriate. Behavior normal. No depression or anxiety noted.    RECENT LABS:  Lab Results   Component Value Date    WBC 15.12 (H) 04/10/2018    HGB 13.6 04/10/2018     (H) 04/10/2018     No results found for: INR  Lab Results   Component Value Date    AST 28 04/10/2018    ALT 30 04/10/2018    BILITOT 0.6 04/10/2018    ALBUMIN 4.1 04/10/2018    ALKPHOS 104 04/10/2018    CREATININE 0.9 04/10/2018    BUN 10 04/10/2018     04/10/2018    K 3.8 04/10/2018     RECENT IMAGING:  CT 04/2018  Narrative     EXAMINATION:  CT ABDOMEN PELVIS WITH CONTRAST    CLINICAL HISTORY:  gen abd pain, n/v,; Gastro-esophageal reflux disease without esophagitis    TECHNIQUE:  Low dose axial images, sagittal and coronal reformations were obtained from the lung bases to the pubic symphysis following the IV administration of 100 mL of Omnipaque 350 and the oral administration of 30 mL of Omnipaque 350.    COMPARISON:  None.    FINDINGS:  No consolidation within the visualized lungs.    There is mild enlargement of the liver measuring 17 cm in craniocaudal dimension.  There is slight decreased attenuation of the liver concerning for fatty infiltration.  The spleen and pancreas are normal in size without focal lesions.  No calcified gallstones in the gallbladder by CT criteria.    The adrenal glands are within normal limits.    The kidneys are normal in size  measuring 12 cm in length bilaterally.  No hydronephrosis.  There is uptake of contrast bilaterally.    There is no free intraperitoneal gas or fluid collection.  No aneurysmal dilatation of the abdominal aorta.  There is IUD within the uterine fundus.    3 mm calcification within the distal appendix suggestive for appendicoliths the appendix is not enlarged or significant periappendiceal induration to suggest acute appendicitis.    Please note there is small free fluid in the right adnexa extending to adjacent tortuous distal ileal small bowel loop which may represent extension of fluid from right ovary with presumed corpus luteum right ovary measuring 1.6 cm.  Clinical correlation and further evaluation with pelvic ultrasonography as warranted.    No focal pelvic mass or fluid collection.    There is colonic diverticulosis without evidence for acute diverticulitis.   Impression       No signs of acute appendicitis with 3 mm appendicolith .    With 1.6 cm peripheral enhancing focus in the right ovary which may represent physiologic fluid and corpus luteal cyst.  Further evaluation with pelvic ultrasonography as warranted.    IUD identified.    Mild hepatomegaly with probable fatty infiltration of the liver.    Please see above for additional details.     ASSESSMENT  33 y.o. White female with:  1. HEPATIC STEATOSIS  -- risk factors: BMI and HTN  -- normal transaminases  -- stage fibrosis with fibroscan  -- discussed weight loss, dietary control, currently intolerant to exercise due to severe reflux    EDUCATION:  We discussed the manifestations of NAFLD. At this time there is no FDA approved therapy for NAFLD.   The patient and I discussed the importance of diet, exercise, and weight loss for management of NAFLD. We discussed a low fat, low carb/low sugar, high fiber diet, and a goal of 30 minutes of exercise 5 times per week.   Pt is aware that fatty liver can progress to steatohepatitis and possibly to cirrhosis,  putting one at increased risk for liver cancer, liver failure, and death.        PLAN:  1. Fibroscan today  2. F/u TBD based on work out     Thank you for allowing me to participate in the care of Lou Barney PA-C    Fibroscan F0-F1, S3, can follow up PRN if enzymes become abnormal

## 2018-05-24 ENCOUNTER — TELEPHONE (OUTPATIENT)
Dept: GASTROENTEROLOGY | Facility: CLINIC | Age: 33
End: 2018-05-24

## 2018-05-24 NOTE — PROVATION PATIENT INSTRUCTIONS
Discharge Summary/Instructions after an Endoscopic Procedure  Patient Name: Lou Lucio  Patient MRN: [id number]  Patient YOB: 1985  Thursday, May 17, 2018  Sirisha Overton MD  RESTRICTIONS:  During your procedure today, you received medications for sedation.  These   medications may affect your judgment, balance and coordination.  Therefore,   for 24 hours, you have the following restrictions:   - DO NOT drive a car, operate machinery, make legal/financial decisions,   sign important papers or drink alcohol.    ACTIVITY:  The following day: return to full activity including work, except no heavy   lifting, straining or running for 3 days if polyps were removed.  DIET:  Eat and drink normally unless instructed otherwise.     TREATMENT FOR COMMON SIDE EFFECTS:  - Mild abdominal pain, nausea, belching, bloating or excessive gas:  rest,   eat lightly and use a heating pad.  - Sore Throat: treat with throat lozenges and/or gargle with warm salt   water.  - Because air was used during the procedure, expelling large amounts of air   from your rectum or belching is normal.  - If a bowel prep was taken, you may not have a bowel movement for 1-3 days.    This is normal.  SYMPTOMS TO WATCH FOR AND REPORT TO YOUR PHYSICIAN:  1. Abdominal pain or bloating, other than gas cramps.  2. Chest pain.  3. Back pain.  4. Signs of infection such as: chills or fever occurring within 24 hours   after the procedure.  5. Rectal bleeding, which would show as bright red, maroon, or black stools.   (A tablespoon of blood from the rectum is not serious, especially if   hemorrhoids are present.)  6. Vomiting.  7. Weakness or dizziness.  GO DIRECTLY TO THE NEAREST EMERGENCY ROOM IF YOU HAVE ANY OF THE FOLLOWING:      Difficulty breathing              Chills and/or fever over 101 F   Persistent vomiting and/or vomiting blood   Severe abdominal pain   Severe chest pain   Black, tarry stools   Bleeding- more than one tablespoon   Any other  symptom or condition that you feel may need urgent attention  Your doctor recommends these additional instructions:  If any biopsies were taken, your doctors clinic will contact you in 1 to 2   weeks with any results.  - Return to my office as previously scheduled.  For questions, problems or results please call your physician - Sirisha Overton MD at Work:  [phone].  [facility], EMERGENCY ROOM PHONE NUMBER: [phone]  IF A COMPLICATION OR EMERGENCY SITUATION ARISES AND YOU ARE UNABLE TO REACH   YOUR PHYSICIAN - GO DIRECTLY TO THE EMERGENCY ROOM.  Sirisha Overton MD  5/24/2018 5:49:22 PM  This report has been verified and signed electronically.  [employer]

## 2018-05-24 NOTE — TELEPHONE ENCOUNTER
PH impedence     Significant acid reflux on PPI BID based on pH data.    [place] Score (<14.7): 46.  Total percent time pH less than 4 (< 4.5; <1 if PPI BID): 13.4.   Weakly acidic reflux on ppi   No non-acidic reflux  Good correlation between heartburn and weakly acid episodes    Good correlation between regurgitation and acidic and weakly acidic reflux episodes  Results may be affected by presence of rumination syndrome documented on esophageal manometry     Please let patient know that the BRAVO shows  Acid reflux and weakly acidic reflux on PPI   Good correlation between heartburn and weakly acid episodes    Good correlation between regurgitation and acidic and weakly acidic reflux episodes  These results maybe affected by rumination syndrome.  We need to work on addressing rumination first.  We will consider antireflux treatment options if she ruano snot get better      Recs:   Continue PPI   Follow up with Dr. Overton as previously requested

## 2018-05-25 NOTE — TELEPHONE ENCOUNTER
Spoke with patient and reviewed the results.    Patient verbalizes understanding.    Patient is concerned with the Rumination syndrome.  Is this diagnoses or is this a possible of option of what is going on?    Patient read about rumination syndrome and is concerned.

## 2018-06-06 ENCOUNTER — OFFICE VISIT (OUTPATIENT)
Dept: PSYCHIATRY | Facility: CLINIC | Age: 33
End: 2018-06-06
Payer: COMMERCIAL

## 2018-06-06 DIAGNOSIS — F06.4 ANXIETY DISORDER DUE TO MEDICAL CONDITION: Primary | ICD-10-CM

## 2018-06-06 PROCEDURE — 90791 PSYCH DIAGNOSTIC EVALUATION: CPT | Mod: S$GLB,,, | Performed by: PSYCHOLOGIST
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
## 2018-07-05 ENCOUNTER — OFFICE VISIT (OUTPATIENT)
Dept: GASTROENTEROLOGY | Facility: CLINIC | Age: 33
End: 2018-07-05
Payer: COMMERCIAL

## 2018-07-05 VITALS
SYSTOLIC BLOOD PRESSURE: 131 MMHG | DIASTOLIC BLOOD PRESSURE: 97 MMHG | WEIGHT: 201.25 LBS | HEIGHT: 66 IN | BODY MASS INDEX: 32.34 KG/M2 | HEART RATE: 82 BPM

## 2018-07-05 DIAGNOSIS — F98.21 RUMINATION DISORDER: Primary | ICD-10-CM

## 2018-07-05 PROCEDURE — 99214 OFFICE O/P EST MOD 30 MIN: CPT | Mod: S$GLB,,, | Performed by: NURSE PRACTITIONER

## 2018-07-05 PROCEDURE — 99999 PR PBB SHADOW E&M-EST. PATIENT-LVL III: CPT | Mod: PBBFAC,,, | Performed by: NURSE PRACTITIONER

## 2018-07-05 PROCEDURE — 3008F BODY MASS INDEX DOCD: CPT | Mod: CPTII,S$GLB,, | Performed by: NURSE PRACTITIONER

## 2018-07-05 NOTE — LETTER
July 5, 2018      Abdelrahman Yates MD  [address]   [place] [address]           [facility]  [address]  [place] [address]  Phone: [phone]  Fax: [phone]          Patient: Lou Lucio   MR Number: [id number]   YOB: 1985   Date of Visit: 7/5/2018       Dear Dr. Abdelrahman Yates:    Thank you for referring Lou Lucio to me for evaluation. Attached you will find relevant portions of my assessment and plan of care.    If you have questions, please do not hesitate to call me. I look forward to following Lou Lucio along with you.    Sincerely,    Pat Overton M.D.      Enclosure  CC:  No Recipients    If you would like to receive this communication electronically, please contact [email or web address] or [phone] to request more information on [employer] access.    For providers and/or their staff who would like to refer a patient to [facility], please contact us through our one-stop-shop provider referral line, [facility] , at [phone].    If you feel you have received this communication in error or would no longer like to receive these types of communications, please e-mail [email or web address]

## 2018-07-05 NOTE — PROGRESS NOTES
[facility] Consultation Note    Reason for Consult:    Chief Complaint   Patient presents with    Follow-up    Gastroesophageal Reflux         PCP:   Primary Doctor No   [address] [place] [address]    Referring MD:  Abdelrahman Yates Md  [address] [place] [address]      HPI:  Lou Lucio is a 33 y.o. female with a PMH of HTN here for diaphragmatic breathing teaching.    Rumination Syndrome ROS  Symptoms occur within 30 minutes of finishing a meal: yes  Regurgitate lacks sour or bitter taste: sometimes  Regurgitate described as tasting similar to the food recently ingested: yes  Little or no improvement after GERD or nausea directed treatment:yes  No symptoms during sleep or fasting: no  Related to belching: yes    She had esophageal manometry confirming rumination.     Discussed etiology, pathophysiology, evaluation and treatment of rumination syndrome.   Discussed the benefit of diaphragmatic breathing in management of rumination.   Pt taught the proper techniques of diaphragmatic breathing.        Total visit time was 30 minutes, more than 50% of which was spent in face-to-face counseling with patient regarding symptoms, diagnostic results, prognosis, risks and benefits of treatment options, instructions for management, importance of compliance with chosen treatment options, risk factor reduction, stress reduction, coping strategies.           Previous Studies:   Esophageal Manometry 5/17/18: No Marinette Classification Abnormality   Complete bolus clearance  No Hiatal hernia   Normal multiple rapid swallows test  No signioficant changes with provocative maneuvers  Aerphagia  Rumination Syndrome    PH impedence 5/17/18: Significant acid reflux on PPI BID based on pH data.    DeMeester Score (<14.7): 46.  Total percent time pH less than 4 (< 4.5; <1 if PPI BID): 13.4.   Weakly acidic reflux on ppi   No non-acidic reflux  Good correlation between heartburn  and weakly acid episodes    Good correlation between regurgitation and acidic and weakly acidic reflux episodes  Results may be affected by presence of rumination syndrome documented on esophageal manometry    GES 5/4/18: NL. 4% retention at 4 hrs  EGD 4/30/18:NL esophagus (scattered intraepithelial lymphocytes and reactive change). Gastric erythema (reactive gastropathy). NL duodenum (-).  Colon 4/30/18:GPTTI. Sigmoid tics. NL colon (-). Rectal erythema (-). Non bleeding int hemorrhoid. NL TI (-). Rpt at 50 yrs old for screening.   CT abd/pelivs 4/14/18: ovarian cysts. Fatty liver. Appendicoliths.   EGD 6/9/17: NL esophagus. STRETTA. Small HH. NL stomach. NL duodenum.   EGD 2/17/17:ringed esophagus and feline appearence in middle third esophagus (no tissue). Diffuse mild gastric erythema (-). Nl duodenum.  Esophageal manometry 3/9/17: distal esophageal spasm, no EGJ outflow obstruction.  IRP NL 10.9; DCI NL 1840.2; DL -2.5?; 8% failed swallows, 8% pan esophageal pressurization, 58% premature contractions, no HH.  Personally reviewed by Dr. Overton   PH impedence off meds 3/9/17:  Elevated DeMester score (47.8) indicates severe reflux. 13.5% time in reflux: 24 % upright, 3.3 % supine, 13.5% postprandial.  SI/SAP: heartburn 100.0/99.9; CP 96.2/100.0; regur 96.4/100.0; belch 100.0/98.6;  100% proximal esophageal reflux.  Personally reviewed by Dr. Overton   *EGD/colon 2012:pt reports c.diff. normal procedures.    *per referring provider note/pt report      ROS:  ROS   Constitutional: No fevers, no chills, no night sweats, no weight loss  ENT: No congestion, + rhinorrhea, + chronic sinus problems  CV: No chest pain, no palpitations  Pulm: + cough, no shortness of breath  Ophtho: No blurry vision, no eye redness  GI: see HPI  Derm: No rash  Heme: No lymphadenopathy, no bruising  MSK: No arthritis, no joint swelling, no Raynauds  : No dysuria, + frequent urination, no blood in urine  Endo: No hot or cold  intolerance  Neuro: No dizziness, no syncope, no seizure  Psych: No anxiety, no depression        Medical History:   Past Medical History:   Diagnosis Date    C. difficile colitis     GERD (gastroesophageal reflux disease)     Hypertension     Irritable bowel syndrome     Migraines         Surgical History:   Past Surgical History:   Procedure Laterality Date    COLONOSCOPY  2012    COLONOSCOPY N/A 4/30/2018    Procedure: Colonoscopy;  Surgeon: Sirisha Overton MD;  Location: [address];  Service: Endoscopy;  Laterality: N/A;  PM prep    ESOPHAGOGASTRODUODENOSCOPY  2017        Family History:   Family History   Problem Relation Age of Onset    Lung cancer Maternal Grandfather     Esophageal cancer Paternal Grandfather 50        smoker, drinker    Birth defects Paternal Grandfather 50        esophageal    Colon cancer Neg Hx     Crohn's disease Neg Hx     Inflammatory bowel disease Neg Hx     Irritable bowel syndrome Neg Hx     Liver cancer Neg Hx     Rectal cancer Neg Hx     Stomach cancer Neg Hx     Celiac disease Neg Hx         Social History:   Social History     Social History    Marital status:      Spouse name: N/A    Number of children: N/A    Years of education: N/A     Social History Main Topics    Smoking status: Never Smoker    Smokeless tobacco: Never Used    Alcohol use Yes      Comment: less than weekly    Drug use: No    Sexual activity: Not Asked     Other Topics Concern    None     Social History Narrative    None        Review of patient's allergies indicates:  Allergies not on file    Current Outpatient Prescriptions   Medication Sig Dispense Refill    BYSTOLIC 5 mg Tab TK 1 T PO QD IN THE EMEKA  5    eletriptan (RELPAX) 40 MG tablet Take 40 mg by mouth as needed. may repeat in 2 hours if necessary      ondansetron (ZOFRAN) 8 MG tablet Take 1 tablet (8 mg total) by mouth every 8 (eight) hours as needed for Nausea. 90 tablet 3    pantoprazole  "(PROTONIX) 40 MG tablet Take 1 tablet (40 mg total) by mouth 2 (two) times daily. 60 tablet 5    CARAFATE 100 mg/mL suspension SHAKE LQ AND TK 10 ML PO QID PRN  1     No current facility-administered medications for this visit.         Objective Findings:  Vital Signs:  BP (!) 131/97   Pulse 82   Ht 5' 6" (1.676 m)   Wt 91.3 kg (201 lb 4.5 oz)   BMI 32.49 kg/m²   Body mass index is 32.49 kg/m².    Physical Exam:  General appearance: alert, cooperative, no distress  HENT: Normocephalic, atraumatic, neck symmetrical, no nasal discharge  Eyes: conjunctivae/corneas clear, PERRL, EOM's intact  Lungs: clear to auscultation bilaterally, no dullness to percussion bilaterally  Heart: regular rate and rhythm without rub; no displacement of the PMI  Abdomen: soft, non-tender; bowel sounds normoactive; no organomegaly  Extremities: extremities symmetric; no clubbing, cyanosis, or edema  Integument: Skin color, texture, turgor normal; no rashes; hair distrubution normal  Neurologic: Alert and oriented X 3, normal strength, normal coordination and gait  Psychiatric: no pressured speech; normal affect; no evidence of impaired cognition    Labs:  Lab Results   Component Value Date    WBC 15.12 (H) 04/10/2018    HGB 13.6 04/10/2018    HCT 40.6 04/10/2018    MCV 91 04/10/2018     (H) 04/10/2018     No results found for: FERRITIN  Lab Results   Component Value Date     04/10/2018    K 3.8 04/10/2018     04/10/2018    CO2 25 04/10/2018    GLU 91 04/10/2018    BUN 10 04/10/2018    CREATININE 0.9 04/10/2018    CALCIUM 9.5 04/10/2018    PROT 7.2 04/10/2018    ALBUMIN 4.1 04/10/2018    BILITOT 0.6 04/10/2018    ALKPHOS 104 04/10/2018    AST 28 04/10/2018    ALT 30 04/10/2018     Lab Results   Component Value Date    TSH 2.313 04/10/2018     No results found for: SEDRATE  No results found for: CRP  No results found for: LABA1C, HGBA1C        Assessment and Plan:  Lou Lucio is a 33 y.o. female with a PMH of HTN here " for diaphragmatic breathing.    Rumination syndrome. Manometry confirming rumination.  -Discussed etiology, pathophysiology, evaluation and treatment of rumination syndrome.    -Discussed the benefit of diaphragmatic breathing in management of functional Gi disorders. Provided handout.   -Taught technique of diaphragmatic breathing.     -PT to practice diaphragmatic breathing throughout the day. Once comfortable with the technique, PT To  perform diaphragmatic breathing with meals.         Follow-up in about 1 month (around 8/5/2018) for Motility with Dr. Overton.    1. Rumination disorder          Order summary:         Thank you so much for allowing me to participate in the care of Lou Moreno, ENRICO, FNP-C

## 2018-07-10 ENCOUNTER — OFFICE VISIT (OUTPATIENT)
Dept: PSYCHIATRY | Facility: CLINIC | Age: 33
End: 2018-07-10
Payer: COMMERCIAL

## 2018-07-10 DIAGNOSIS — F06.4 ANXIETY DISORDER DUE TO MEDICAL CONDITION: Primary | ICD-10-CM

## 2018-07-10 PROCEDURE — 90834 PSYTX W PT 45 MINUTES: CPT | Mod: S$GLB,,, | Performed by: PSYCHOLOGIST
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
## 2018-07-27 ENCOUNTER — OFFICE VISIT (OUTPATIENT)
Dept: PSYCHIATRY | Facility: CLINIC | Age: 33
End: 2018-07-27
Payer: COMMERCIAL

## 2018-07-27 DIAGNOSIS — F06.4 ANXIETY DISORDER DUE TO MEDICAL CONDITION: Primary | ICD-10-CM

## 2018-07-27 PROCEDURE — 90834 PSYTX W PT 45 MINUTES: CPT | Mod: S$GLB,,, | Performed by: PSYCHOLOGIST
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
## 2018-08-10 ENCOUNTER — OFFICE VISIT (OUTPATIENT)
Dept: PSYCHIATRY | Facility: CLINIC | Age: 33
End: 2018-08-10
Payer: COMMERCIAL

## 2018-08-10 DIAGNOSIS — F41.9 ANXIETY: Primary | ICD-10-CM

## 2018-08-10 PROCEDURE — 90834 PSYTX W PT 45 MINUTES: CPT | Mod: S$GLB,,, | Performed by: PSYCHOLOGIST

## 2018-08-10 NOTE — PROGRESS NOTES
"Individual Psychotherapy (PhD/LCSW)    8/10/2018    Site:  [facility]         Therapeutic Intervention: Met with patient.  Outpatient - Insight oriented psychotherapy 45 min - CPT code 29436    Chief complaint/reason for encounter: anxiety     Interval history and content of current session: Pt spent time going over emotional regulation information and reports that she found it "interesting". We discussed her thoughts about it today, focusing on her overidentification with anger as well as her lack of patience for sadness. Pt was able to understand both of these emotions better through the lens of ER skills - learning the purpose of emotions, why she might be resistant to regulating her emotions, and what myths she holds about emotions. Pt recognized that she tends to think of emotions in terms of the actions they lead to, rather than the feelings, thoughts, sensations, etc that precede the feelings. Provided pt with worksheets for "Check the Facts" skill so that she can further work on problem anger, and can learn to notice when anger is either inappropriate or ineffective, as well as "Opposite Action" skill. Will go over these with pt in session next time, with purpose of helping pt downregulate unhelpful anger and upregulate sadness rather than suppress it and then have it feel "explosive" and unmanageable.    Treatment plan:  · Target symptoms: anxiety   · Why chosen therapy is appropriate versus another modality: relevant to diagnosis, patient responds to this modality  · Outcome monitoring methods: self-report, observation  · Therapeutic intervention type: insight oriented psychotherapy    Risk parameters:  Patient reports no suicidal ideation  Patient reports no homicidal ideation  Patient reports no self-injurious behavior  Patient reports no violent behavior    Verbal deficits: None    Patient's response to intervention:  The patient's response to intervention is accepting.    Progress toward goals " and other mental status changes:  The patient's progress toward goals is fair .    Diagnosis:   Anxiety Disorder NOS    Plan:  individual psychotherapy     Return to clinic: 2 weeks    Length of Service (minutes): 45

## 2018-08-15 ENCOUNTER — OFFICE VISIT (OUTPATIENT)
Dept: GASTROENTEROLOGY | Facility: CLINIC | Age: 33
End: 2018-08-15
Payer: COMMERCIAL

## 2018-08-15 ENCOUNTER — HOSPITAL ENCOUNTER (OUTPATIENT)
Dept: CARDIOLOGY | Facility: CLINIC | Age: 33
Discharge: HOME | End: 2018-08-15
Payer: COMMERCIAL

## 2018-08-15 VITALS
DIASTOLIC BLOOD PRESSURE: 91 MMHG | WEIGHT: 197.56 LBS | BODY MASS INDEX: 31.75 KG/M2 | HEIGHT: 66 IN | HEART RATE: 77 BPM | SYSTOLIC BLOOD PRESSURE: 122 MMHG

## 2018-08-15 DIAGNOSIS — Z51.81 THERAPEUTIC DRUG MONITORING: ICD-10-CM

## 2018-08-15 DIAGNOSIS — R10.9 ABDOMINAL PAIN, UNSPECIFIED ABDOMINAL LOCATION: ICD-10-CM

## 2018-08-15 DIAGNOSIS — K21.9 GASTROESOPHAGEAL REFLUX DISEASE, ESOPHAGITIS PRESENCE NOT SPECIFIED: ICD-10-CM

## 2018-08-15 DIAGNOSIS — R68.81 EARLY SATIETY: ICD-10-CM

## 2018-08-15 DIAGNOSIS — R19.7 DIARRHEA, UNSPECIFIED TYPE: ICD-10-CM

## 2018-08-15 DIAGNOSIS — R11.0 NAUSEA: ICD-10-CM

## 2018-08-15 DIAGNOSIS — R09.A2 GLOBUS PHARYNGEUS: ICD-10-CM

## 2018-08-15 DIAGNOSIS — R14.0 BLOATING: ICD-10-CM

## 2018-08-15 DIAGNOSIS — R11.10 RUMINATION SYNDROME OF INGESTED FOOD IN ADULT: Primary | ICD-10-CM

## 2018-08-15 PROCEDURE — 3008F BODY MASS INDEX DOCD: CPT | Mod: CPTII,S$GLB,, | Performed by: INTERNAL MEDICINE

## 2018-08-15 PROCEDURE — 99999 PR PBB SHADOW E&M-EST. PATIENT-LVL III: CPT | Mod: PBBFAC,,, | Performed by: INTERNAL MEDICINE

## 2018-08-15 PROCEDURE — 99215 OFFICE O/P EST HI 40 MIN: CPT | Mod: S$GLB,,, | Performed by: INTERNAL MEDICINE

## 2018-08-15 PROCEDURE — 93000 ELECTROCARDIOGRAM COMPLETE: CPT | Mod: S$GLB,,, | Performed by: INTERNAL MEDICINE

## 2018-08-15 RX ORDER — PANTOPRAZOLE SODIUM 40 MG/1
40 TABLET, DELAYED RELEASE ORAL 2 TIMES DAILY
Qty: 180 TABLET | Refills: 3 | Status: SHIPPED | OUTPATIENT
Start: 2018-08-15 | End: 2019-09-05 | Stop reason: SDUPTHER

## 2018-08-15 RX ORDER — ERGOCALCIFEROL 1.25 MG/1
CAPSULE ORAL
COMMUNITY
Start: 2018-08-09 | End: 2019-05-13

## 2018-08-15 RX ORDER — METOCLOPRAMIDE 10 MG/1
TABLET ORAL
COMMUNITY
Start: 2018-08-09 | End: 2021-11-03

## 2018-08-15 NOTE — PROGRESS NOTES
[facility] Consultation Note    Reason for Consult:    Chief Complaint   Patient presents with    Dysphagia    Heartburn    Nausea    Abdominal Pain    Bloated    Diarrhea    Irritable Bowel Syndrome    rumination syndrome         PCP:   Primary Doctor No       Referring MD:  Abdelrahman Yates Md  [address], [address]  [place] [address]    HPI:  Lou Lucio is a 33 y.o. female with a PMH of HTN referred to motility clinic for second opinion regarding the following problems:    GERD.  Improved.  Still having some heartburn. Has symptoms few times per week.  Sx get worse if forgets ppi.   Takes protonix 80 daily  No longer taking zantac 300mg BID   Not taking carafate     Globus sensation. Significantly improved w CBD and diaphragmatic breathing.  Minimal in the past week.   Dysphagia.     Nausea. Improved with diaphragmatic breathing and decreasing portions.   No early satiety   Frequency:several days weekly  Onset: 2012  Vomiting.  Rare   Takes zofran prn     Abdominal pain. Resolved.  No pain since last visit    Gas and bloating. Improved.    Diarrhea. No diarrhea since last visit.  Stools are Hamblen 3-4. Bm every other day.  Did not try cholestyramine .  Changed her diet.      H/o c diff in 2012. After abx course for salmonella. Treated with PO abx. Resolved.     Blood in stool. Resolved.     Migraines. Takes relpax 40 mg PRN. Once to twice monthly. H/o Bell's palsy in 2013. treated with lyrica and steroids x few weeks. Seeing neurology since 2008.    Anxiety. Depression. Due to GI s/s. Also with stressful job. Following with Dr. Dash.  Started yoga and meditation.  Feeling much better.     Denies dysphagia,  early satiety, constipation,  melena, weight loss, trouble sleeping    Total visit time was 40 minutes, more than 50% of which was spent in face-to-face counseling with patient regarding symptoms, diagnostic results, prognosis, risks and benefits of  treatment options, instructions for management, importance of compliance with chosen treatment options, risk factor reduction, stress reduction, coping strategies.      Previous Studies:   Esophageal Manometry 5/17/18: No [place] Classification Abnormality.  Complete bolus clearance.  No Hiatal hernia.  Normal multiple rapid swallows test.  No signioficant changes with provocative maneuvers  Aerophagia. Rumination Syndrome.    PH impedence 5/17/18: Significant acid reflux on PPI BID based on pH data.  DeMeester Score (<14.7): 46. Total percent time pH less than 4 (< 4.5; <1 if PPI BID): 13.4.  Weakly acidic reflux on ppi.  No non-acidic reflux.  Good correlation between heartburn and weakly acid episodes.  Good correlation between regurgitation and acidic and weakly acidic reflux episodes. Results may be affected by presence of rumination syndrome documented on esophageal manometry.    GES 5/4/18: NL. 4% retention at 4 hrs  EGD 4/30/18:NL esophagus (scattered intraepithelial lymphocytes and reactive change). Gastric erythema (reactive gastropathy). NL duodenum (-).  Gastric pH 4.    Colon 4/30/18:GPTTI. Sigmoid tics. NL colon (-). Rectal erythema (-). Non bleeding int hemorrhoid. NL TI (-). Rpt at 50 yrs old for screening.   CT abd/pelivs 4/14/18: ovarian cysts. Fatty liver. Appendicoliths.   EGD 6/9/17: NL esophagus. STRETTA. Small HH. NL stomach. NL duodenum.   EGD 2/17/17:ringed esophagus and feline appearence in middle third esophagus (no tissue). Diffuse mild gastric erythema (-). Nl duodenum.  Esophageal manometry 3/9/17: distal esophageal spasm, no EGJ outflow obstruction.  IRP NL 10.9; DCI NL 1840.2; DL -2.5?; 8% failed swallows, 8% pan esophageal pressurization, 58% premature contractions, no HH.  Personally reviewed by Dr. Overton   PH impedence off meds 3/9/17:  Elevated DeMester score (47.8) indicates severe reflux. 13.5% time in reflux: 24 % upright, 3.3 % supine, 13.5% postprandial.  SI/SAP: heartburn  100.0/99.9; CP 96.2/100.0; regur 96.4/100.0; belch 100.0/98.6;  100% proximal esophageal reflux.  Personally reviewed by Dr. Overton   *EGD/colon 2012:pt reports c.diff. normal procedures.    *per referring provider note/pt report    LABS:  pEos H  IgE H  TTg/IgA-  TSH nl     ROS:  ROS   Constitutional: No fevers, no chills, no night sweats, no weight loss  ENT: No congestion, no rhinorrhea, + chronic sinus problems  CV: No chest pain, no palpitations  Pulm: no cough, no shortness of breath  Ophtho: No blurry vision, no eye redness  GI: see HPI  Derm: No rash  Heme: No lymphadenopathy, no bruising  MSK: No arthritis, no joint swelling, no Raynauds  : No dysuria, no frequent urination, no blood in urine  Endo: No hot or cold intolerance  Neuro: No dizziness, no syncope, no seizure  Psych: No anxiety, no depression        Medical History:   Past Medical History:   Diagnosis Date    C. difficile colitis     GERD (gastroesophageal reflux disease)     Hypertension     Irritable bowel syndrome     Migraines         Surgical History:   Past Surgical History:   Procedure Laterality Date    COLONOSCOPY  2012    ESOPHAGOGASTRODUODENOSCOPY  2017        Family History:   Family History   Problem Relation Age of Onset    Lung cancer Maternal Grandfather     Esophageal cancer Paternal Grandfather 50        smoker, drinker    Birth defects Paternal Grandfather 50        esophageal    Colon cancer Neg Hx     Crohn's disease Neg Hx     Inflammatory bowel disease Neg Hx     Irritable bowel syndrome Neg Hx     Liver cancer Neg Hx     Rectal cancer Neg Hx     Stomach cancer Neg Hx     Celiac disease Neg Hx     Cystic fibrosis Neg Hx     Hemochromatosis Neg Hx     Ulcerative colitis Neg Hx     Liver disease Neg Hx     Christian's disease Neg Hx     Lymphoma Neg Hx     Tuberculosis Neg Hx     Rheum arthritis Neg Hx     Scleroderma Neg Hx     Melanoma Neg Hx     Multiple sclerosis Neg Hx     Lupus Neg Hx      "Psoriasis Neg Hx     Skin cancer Neg Hx         Social History:   Social History     Socioeconomic History    Marital status:      Spouse name: None    Number of children: None    Years of education: None    Highest education level: None   Social Needs    Financial resource strain: None    Food insecurity - worry: None    Food insecurity - inability: None    Transportation needs - medical: None    Transportation needs - non-medical: None   Occupational History    None   Tobacco Use    Smoking status: Never Smoker    Smokeless tobacco: Never Used   Substance and Sexual Activity    Alcohol use: Yes     Comment: less than weekly    Drug use: No    Sexual activity: None   Other Topics Concern    None   Social History Narrative    None        Review of patient's allergies indicates:  Allergies not on file    Current Outpatient Medications   Medication Sig Dispense Refill    BYSTOLIC 5 mg Tab TK 1 T PO QD IN THE EMEKA  5    eletriptan (RELPAX) 40 MG tablet Take 40 mg by mouth as needed. may repeat in 2 hours if necessary      ondansetron (ZOFRAN) 8 MG tablet Take 1 tablet (8 mg total) by mouth every 8 (eight) hours as needed for Nausea. 90 tablet 3    ergocalciferol (ERGOCALCIFEROL) 50,000 unit Cap       metoclopramide HCl (REGLAN) 10 MG tablet       pantoprazole (PROTONIX) 40 MG tablet Take 1 tablet (40 mg total) by mouth 2 (two) times daily. 180 tablet 3     No current facility-administered medications for this visit.         Objective Findings:  Vital Signs:  BP (!) 122/91   Pulse 77   Ht 5' 6" (1.676 m)   Wt 89.6 kg (197 lb 8.5 oz)   BMI 31.88 kg/m²   Body mass index is 31.88 kg/m².    Physical Exam:  General appearance: alert, cooperative, no distress  HENT: Normocephalic, atraumatic, neck symmetrical, no nasal discharge  Eyes: conjunctivae/corneas clear, PERRL, EOM's intact  Lungs: clear to auscultation bilaterally, no dullness to percussion bilaterally  Heart: regular rate and " rhythm without rub; no displacement of the PMI  Abdomen: soft, non-tender; bowel sounds normoactive; no organomegaly  Extremities: extremities symmetric; no clubbing, cyanosis, or edema  Integument: Skin color, texture, turgor normal; no rashes; hair distrubution normal  Neurologic: Alert and oriented X 3, normal strength, normal coordination and gait  Psychiatric: no pressured speech; normal affect; no evidence of impaired cognition    Labs:  Lab Results   Component Value Date    WBC 15.12 (H) 04/10/2018    HGB 13.6 04/10/2018    HCT 40.6 04/10/2018    MCV 91 04/10/2018     (H) 04/10/2018     No results found for: FERRITIN  Lab Results   Component Value Date     04/10/2018    K 3.8 04/10/2018     04/10/2018    CO2 25 04/10/2018    GLU 91 04/10/2018    BUN 10 04/10/2018    CREATININE 0.9 04/10/2018    CALCIUM 9.5 04/10/2018    PROT 7.2 04/10/2018    ALBUMIN 4.1 04/10/2018    BILITOT 0.6 04/10/2018    ALKPHOS 104 04/10/2018    AST 28 04/10/2018    ALT 30 04/10/2018     Lab Results   Component Value Date    TSH 2.313 04/10/2018     No results found for: SEDRATE  No results found for: CRP  No results found for: LABA1C, HGBA1C        Assessment and Plan:  Lou Lucio is a 33 y.o. female with a PMH of HTN referred to motility clinic for second opinion regarding the following problems:    GERD.  Hiatal hernia.  Belching. Ph impedence off PPIs with severe reflux.  Stretta in 6/2017.  PH impedence with significant acid reflux on PPI BID, mostly upright.  Gastric pH 4  Very mild improvement after Stretta, unable to wean off PPI  Prevacid and omeprazole lost efficacy.  No improvement with Dexilant.  No longer taking carafate and zantac  Significant improvement with diet and diaphragmatic breathing    -Cont protonix 40 mg BID  -Continue behavioral treatment of rumination syndrome   -Will consider checking gastrin  -Will consider surgical options      Globus sensation.  No dysphagia. Significantly improved    -Start amitriptyline 10mg and titrate based on response    Ringed esophagus on previous EGD (insuficint tissue) at [facility].  Elevated IgE and peripheral Eos.  Negative biopsies on PPI at [facility].  Denies dysphagia   -Repeat CBC w diff and IgE at next visit        Distal esophageal spasm on manometry at OSH.  No [place] Classification abnormality at [facility].  Denies dysphagia or chest pain.      Rumination syndrome demonstrated on manometry.  Likely related to GERD and belching   -Again discussed etiology, pathophysiology and treatment of rumination syndrome.    -Fu w psychology   -Cont diaphragmatic breathing   -Continue yoga/medication.  Provided handout about meditation.     Nausea. Rare vomiting. Normal GES.    Improved w decreased portions/behavioral therapies  -Zofran 8 mg q8hr PRN    Abdominal pain. IBS diagnosed in 2012.  Significantly improved.   Unable to tolerate amitriptyline due to fatigue  Some improvement with levsin PRN  Has not tried Bentyl, Levbid, IBgurd.    Gas and bloating. Distention.  Significantly improved.     Avoid artificial sugars and lactose    Diarrhea. Few times monthly. Few episodes of severe diarrhea with nocturnal symptoms and incontinence.  No celiac.  Negative colonoscopy.  Significantly improved   -Questran 1 g QID PRN.  Not needed     Recurrent episodes of nausea, vomiting, abdominal pain and severe diarrhea lasting a day (3-4 times in the past 2 years).  No recent episodes   -Check LTS, lipase, US during episode    H/o salmonella and c diff in 2012. Treated with PO abx with complete resolution    Blood in stool.  Hemorrhoids on colonoscopy.  Now resolved.     Anxiety. Depression.  Stress.  Mostly related to GI symptoms. Works as an .  Improved w behavioral measures    -Discussed the benefit of psychosocial therapy in management of functional GI disorders.  Provided handout.  -Continue to follow with Dr. Regina Dash for CBT/mindfulness based therapy/stress reduction  training.  -Continue stress reduction, relaxation, yoga, diaphragmatic breathing       Migraines.   Well controlled with relpax PRN.  Followed by neurology     Elevated WBC/plt  -Will repeat at next visit.  Pt did not want to get it done today      Follow-up in about 4 months (around 12/15/2018) for Dr. Overton .    1. Rumination syndrome of ingested food in adult    2. Therapeutic drug monitoring    3. Nausea    4. Globus pharyngeus    5. Abdominal pain, unspecified abdominal location    6. Bloating    7. Early satiety    8. Gastroesophageal reflux disease, esophagitis presence not specified    9. Diarrhea, unspecified type          Order summary:  Orders Placed This Encounter    EKG 12-lead    pantoprazole (PROTONIX) 40 MG tablet         Thank you so much for allowing me to participate in the care of Lou Overton MD

## 2018-08-20 ENCOUNTER — PATIENT MESSAGE (OUTPATIENT)
Dept: GASTROENTEROLOGY | Facility: CLINIC | Age: 33
End: 2018-08-20

## 2018-08-20 NOTE — TELEPHONE ENCOUNTER
Pls let pt know that her EKG is negative     I would like to start amitriptyline 10mg for 2 weeks.    This will likely make her sleepy and fatigued. Most people get use to the side effects over time. We are starting at very low dose so that her body can get use to it. It takes a long time for this medicine to start working. He will most likely not feel any improvement with this dose.      She should increase it to amitriptyline 20mg after 8 weeks. We will likely need to increase it further at next appointment.

## 2018-08-22 ENCOUNTER — OFFICE VISIT (OUTPATIENT)
Dept: PSYCHIATRY | Facility: CLINIC | Age: 33
End: 2018-08-22
Payer: COMMERCIAL

## 2018-08-22 DIAGNOSIS — F41.9 ANXIETY: Primary | ICD-10-CM

## 2018-08-22 PROCEDURE — 90834 PSYTX W PT 45 MINUTES: CPT | Mod: S$GLB,,, | Performed by: PSYCHOLOGIST

## 2018-08-22 NOTE — PROGRESS NOTES
"Individual Psychotherapy (PhD/LCSW)    8/22/2018    Site:  [facility]         Therapeutic Intervention: Met with patient.  Outpatient - Insight oriented psychotherapy 45 min - CPT code 15810    Chief complaint/reason for encounter: anxiety     Interval history and content of current session: Pt reports that she is feeling "much better". She reports vast improvements to her physical health since starting therapy and yoga, with no further feeling of blockage in her throat. She has read all of the documents provided to her and has been practicing emotion regulation skills with anger. She finds the concept of focusing on whether anger is effective (versus only if it is justified) to be particularly difficult and helpful. Today she discusses her emotional eating and how it is related more to sadness (whereas historically drinking has been associated with anger). Explored patterns of emotional eating and how it has functioned to keep sadness at bay. Now that pt feels better and more steady in her anger management, she thinks it would be helpful to explore sadness and learn how to allow herself to be sad.    Treatment plan:  · Target symptoms: anxiety   · Why chosen therapy is appropriate versus another modality: relevant to diagnosis, patient responds to this modality  · Outcome monitoring methods: self-report, observation  · Therapeutic intervention type: insight oriented psychotherapy    Risk parameters:  Patient reports no suicidal ideation  Patient reports no homicidal ideation  Patient reports no self-injurious behavior  Patient reports no violent behavior    Verbal deficits: None    Patient's response to intervention:  The patient's response to intervention is accepting.    Progress toward goals and other mental status changes:  The patient's progress toward goals is fair .    Diagnosis:   Anxiety Disorder NOS    Plan:  individual psychotherapy     Return to clinic: 2 weeks    Length of Service (minutes): " 45

## 2018-08-28 ENCOUNTER — TELEPHONE (OUTPATIENT)
Dept: GASTROENTEROLOGY | Facility: CLINIC | Age: 33
End: 2018-08-28

## 2018-08-28 NOTE — TELEPHONE ENCOUNTER
----- Message from Emily Arevalo sent at 8/27/2018  3:07 PM CDT -----  Contact: self [phone]  trevormi - returning your call re test results - please call patient at [phone]

## 2018-09-21 ENCOUNTER — OFFICE VISIT (OUTPATIENT)
Dept: PSYCHIATRY | Facility: CLINIC | Age: 33
End: 2018-09-21
Payer: COMMERCIAL

## 2018-09-21 DIAGNOSIS — F41.9 ANXIETY: Primary | ICD-10-CM

## 2018-09-21 PROCEDURE — 90834 PSYTX W PT 45 MINUTES: CPT | Mod: S$GLB,,, | Performed by: PSYCHOLOGIST

## 2018-09-21 NOTE — PROGRESS NOTES
"Individual Psychotherapy (PhD/LCSW)    9/21/2018    Site:  [facility]         Therapeutic Intervention: Met with patient.  Outpatient - Insight oriented psychotherapy 45 min - CPT code 53938    Chief complaint/reason for encounter: anxiety     Interval history and content of current session: Pt continues to report that she is feeling much better both physically and emotionally. Highlighted and underlined many of the changes that have benefited her in the last two months in addition to therapy and yoga: utilizing meditation claribel regularly to work on breathing patterns, setting better boundaries at work, and backing off of being "the reliable one" for her friends and family (which in her mind means committing to too many events/projects/etc) so that she can give herself more down time. Pt reports that she has been "contemplative" lately as she considers some of the choices she has made in her life around work and lifestyle. She discussed how she always thought it was important to be achievement-oriented and to be constantly challenging herself or doing something (i.e. Not being bored), but she is realizing a shift now where she is prioritizing her health, low stress, and slowing down. She has noticed opportunities to stay busy and stressed that she would have taken months ago that she is actively choosing not to do now. She is also recognizing situations that would have easily provoked her into anger in the past that she has chosen to remain calm in. Pt is pleased with overall progress. We decided at this time not to explore around the emotion of sadness, and rather continuing to have her focus on reducing stress and anger, as there is nothing at this time that she is naturally sad about and her main goal of feeling better physically has been able to manifest with current strategies.     Treatment plan:  · Target symptoms: anxiety   · Why chosen therapy is appropriate versus another modality: relevant to " diagnosis, patient responds to this modality  · Outcome monitoring methods: self-report, observation  · Therapeutic intervention type: insight oriented psychotherapy    Risk parameters:  Patient reports no suicidal ideation  Patient reports no homicidal ideation  Patient reports no self-injurious behavior  Patient reports no violent behavior    Verbal deficits: None    Patient's response to intervention:  The patient's response to intervention is accepting.    Progress toward goals and other mental status changes:  The patient's progress toward goals is fair .    Diagnosis:   Anxiety Disorder NOS    Plan:  individual psychotherapy     Return to clinic: Monthly    Length of Service (minutes): 45

## 2018-10-19 ENCOUNTER — OFFICE VISIT (OUTPATIENT)
Dept: PSYCHIATRY | Facility: CLINIC | Age: 33
End: 2018-10-19
Payer: COMMERCIAL

## 2018-10-19 DIAGNOSIS — F41.9 ANXIETY: Primary | ICD-10-CM

## 2018-10-19 PROCEDURE — 90834 PSYTX W PT 45 MINUTES: CPT | Mod: S$GLB,,, | Performed by: PSYCHOLOGIST

## 2018-10-19 NOTE — PROGRESS NOTES
"Individual Psychotherapy (PhD/LCSW)    10/19/2018    Site:  [facility]         Therapeutic Intervention: Met with patient.  Outpatient - Insight oriented psychotherapy 45 min - CPT code 16968    Chief complaint/reason for encounter: anxiety     Interval history and content of current session: Pt reports that she has had a very stressful week at work and realized that she had let go of the healthy activities she had been doing, including breathing exercises, yoga, and eating healthy. She notices that her anger level has remained in control and she is pleased with that, but she lamented how easy it is to let go of positive behaviors. We discussed strategies that might help her reconnect with those things - most of which involved better planning. However, we also explored her "all or nothing" mentality, that leads her to believe she either has be doing all of her healthy activities or none at all. We noticed how that mentality has largely been beneficial to her life and career, but how it might hinder her in terms of creating a more balanced life. Pt continues to report benefit from the accountability and check-in of therapy.    Treatment plan:  · Target symptoms: anxiety   · Why chosen therapy is appropriate versus another modality: relevant to diagnosis, patient responds to this modality  · Outcome monitoring methods: self-report, observation  · Therapeutic intervention type: insight oriented psychotherapy    Risk parameters:  Patient reports no suicidal ideation  Patient reports no homicidal ideation  Patient reports no self-injurious behavior  Patient reports no violent behavior    Verbal deficits: None    Patient's response to intervention:  The patient's response to intervention is accepting.    Progress toward goals and other mental status changes:  The patient's progress toward goals is fair .    Diagnosis:   Anxiety Disorder NOS    Plan:  individual psychotherapy     Return to clinic: Monthly    Length " of Service (minutes): 45

## 2018-10-30 ENCOUNTER — PATIENT MESSAGE (OUTPATIENT)
Dept: GASTROENTEROLOGY | Facility: CLINIC | Age: 33
End: 2018-10-30

## 2018-10-30 RX ORDER — AMITRIPTYLINE HYDROCHLORIDE 10 MG/1
20 TABLET, FILM COATED ORAL NIGHTLY
Qty: 60 TABLET | Refills: 4 | Status: SHIPPED | OUTPATIENT
Start: 2018-10-30 | End: 2019-01-11

## 2018-10-30 NOTE — TELEPHONE ENCOUNTER
I will send a script to your pharmacy.  Sincerely,   Dr. Overton       ----- Message -----     From: Lou Lucio     Sent: 10/30/2018 10:29 AM CDT       To: Sirisha Overton MD  Subject: Prescription    Hi Dr. Overton,     I am writing to you regarding my Amitriptyline prescription. As you know, the last time I saw you, you advised me to begin taking the Amitriptyline at 10 mg and to increase to 20 mg after two weeks. At that time, I had a prescription for Amitriptyline from Dr. Yates for 10 mg per day. However, now I have two issues. First, I have used my last refill from Dr. Yates. Second, now that I have increased to 20 mg each day, I am going to run out of Amitriptyline 15 days before I would be eligible for a refill. In light of the foregoing, can you please write me a new prescription for Amitriptyline at a dose of 20 mg/day?    Thank you,    Lou Lucio

## 2018-11-19 ENCOUNTER — PATIENT MESSAGE (OUTPATIENT)
Dept: PSYCHIATRY | Facility: CLINIC | Age: 33
End: 2018-11-19

## 2018-12-03 ENCOUNTER — OFFICE VISIT (OUTPATIENT)
Dept: PSYCHIATRY | Facility: CLINIC | Age: 33
End: 2018-12-03
Payer: COMMERCIAL

## 2018-12-03 DIAGNOSIS — F41.9 ANXIETY: Primary | ICD-10-CM

## 2018-12-03 PROCEDURE — 90834 PSYTX W PT 45 MINUTES: CPT | Mod: S$GLB,,, | Performed by: PSYCHOLOGIST

## 2018-12-03 NOTE — PROGRESS NOTES
"Individual Psychotherapy (PhD/LCSW)    12/3/2018    Site:  [facility]         Therapeutic Intervention: Met with patient.  Outpatient - Insight oriented psychotherapy 45 min - CPT code 85586    Chief complaint/reason for encounter: anxiety     Interval history and content of current session: Pt reports that she had an emergency appendectomy a few weeks ago which threw off her plans for self-care, and she is now playing "catch up" at work after having missed a week. She reports a relapse in GI symptoms over the past week and has not returned to yoga or breathing exercises. We continued the conversation today about pt's all or nothing mentality toward everything in life. She notes that she is overly rigid and controlling about almost everything and turns a lot of things into competitions (even tracking how many books she reads in a year or how many board games she plays). Explored how this leads to experiencing anxiety and stress even about things that might otherwise be enjoyable. Continue to work on being more present focused and suggested that she throw out some of the tracking sheets she uses for things like this, as it leads her to either be too harsh or too lax with herself. Continue working on strategy of prioritizing what is most important currently and making space and time for that one thing.    Treatment plan:  · Target symptoms: anxiety   · Why chosen therapy is appropriate versus another modality: relevant to diagnosis, patient responds to this modality  · Outcome monitoring methods: self-report, observation  · Therapeutic intervention type: insight oriented psychotherapy    Risk parameters:  Patient reports no suicidal ideation  Patient reports no homicidal ideation  Patient reports no self-injurious behavior  Patient reports no violent behavior    Verbal deficits: None    Patient's response to intervention:  The patient's response to intervention is accepting.    Progress toward goals and other " mental status changes:  The patient's progress toward goals is fair .    Diagnosis:   Anxiety Disorder NOS    Plan:  individual psychotherapy     Return to clinic: Monthly    Length of Service (minutes): 45

## 2018-12-18 ENCOUNTER — OFFICE VISIT (OUTPATIENT)
Dept: PSYCHIATRY | Facility: CLINIC | Age: 33
End: 2018-12-18
Payer: COMMERCIAL

## 2018-12-18 DIAGNOSIS — F41.9 ANXIETY: Primary | ICD-10-CM

## 2018-12-18 PROCEDURE — 90834 PSYTX W PT 45 MINUTES: CPT | Mod: S$GLB,,, | Performed by: PSYCHOLOGIST

## 2018-12-18 NOTE — PROGRESS NOTES
"Individual Psychotherapy (PhD/LCSW)    12/18/2018    Site:  [facility]         Therapeutic Intervention: Met with patient.  Outpatient - Insight oriented psychotherapy 45 min - CPT code 27020    Chief complaint/reason for encounter: anxiety     Interval history and content of current session: Pt reports that she is approaching the new year with a new strategy since our last session, which is to not make any New Years resolutions that are rigid and time bound. Pt recognizes that her rigidity/control orientation may have contributed to anxiety and difficulty managing her illness earlier this year and wants to try prioritizing peace, spontaneity, and present-moment awareness. Pt has been saying no to people asking her to do more (i.e. Join boards) and she has gone to the gym more since our last appointment. Today she talks about the upcoming Christmas holiday with her family and becomes tearful. She reports that her paternal grandmother is quickly deteriorating and will likely die soon, and she described her as the only "ally" in her family, and the only person that has ever expressed pride and unconditional love for her. She describes her maternal grandmother as an Mosque Religion with very conservative views who makes hateful comments that she is chastised for responding back to by other family members. She also describes feeling that she does not "fit in" with her family, as they view her as snooty and "thinking she's better" than others because of her level of education and salary, both of which far exceed anyone else in her family tree. She describes the pain she feels that people view her distrustfully instead of with pride/admiration for her accomplishments. Discussed how she can take care of herself this year during the holidays in the face of these various stressors.    Treatment plan:  · Target symptoms: anxiety   · Why chosen therapy is appropriate versus another modality: relevant to diagnosis, " patient responds to this modality  · Outcome monitoring methods: self-report, observation  · Therapeutic intervention type: insight oriented psychotherapy    Risk parameters:  Patient reports no suicidal ideation  Patient reports no homicidal ideation  Patient reports no self-injurious behavior  Patient reports no violent behavior    Verbal deficits: None    Patient's response to intervention:  The patient's response to intervention is accepting.    Progress toward goals and other mental status changes:  The patient's progress toward goals is fair .    Diagnosis:   Anxiety Disorder NOS    Plan:  individual psychotherapy     Return to clinic: Monthly    Length of Service (minutes): 45

## 2019-01-09 ENCOUNTER — OFFICE VISIT (OUTPATIENT)
Dept: PSYCHIATRY | Facility: CLINIC | Age: 34
End: 2019-01-09
Payer: COMMERCIAL

## 2019-01-09 DIAGNOSIS — F41.9 ANXIETY: Primary | ICD-10-CM

## 2019-01-09 PROCEDURE — 90834 PSYTX W PT 45 MINUTES: CPT | Mod: S$GLB,,, | Performed by: PSYCHOLOGIST

## 2019-01-09 PROCEDURE — 90834 PR PSYCHOTHERAPY W/PATIENT, 45 MIN: ICD-10-PCS | Mod: S$GLB,,, | Performed by: PSYCHOLOGIST

## 2019-01-09 NOTE — PROGRESS NOTES
"Individual Psychotherapy (PhD/LCSW)    1/9/2019    Site:  [facility]         Therapeutic Intervention: Met with patient.  Outpatient - Insight oriented psychotherapy 45 min - CPT code 90638    Chief complaint/reason for encounter: anxiety     Interval history and content of current session: Pt reports that she is doing relatively well and had a good holiday season. Continues to report lower anxiety levels than early in treatment, with the exception of New Years Nita, when she was too anxious to leave the house because she worried one of the neighbors would set her house on fire accidentally with fireworks and her dog would be alone. Pt did access some sadness today when she discussed being the "black sheep" of her family because of all of her accomplishments, intellect, and salary. She notices other people in her family being praised for accomplishments/successes but does not get the same treatment. She assumes this is due to her family members believing that she thinks she is better than the rest of them. We discussed alternative possibilities (I.e. Her family members being intimidated or insecure). Also explored ways for her to improve communication with her family about accomplishments, and how generally she might want to approach increasing discussion and closeness with her mother.    Treatment plan:  · Target symptoms: anxiety   · Why chosen therapy is appropriate versus another modality: relevant to diagnosis, patient responds to this modality  · Outcome monitoring methods: self-report, observation  · Therapeutic intervention type: insight oriented psychotherapy    Risk parameters:  Patient reports no suicidal ideation  Patient reports no homicidal ideation  Patient reports no self-injurious behavior  Patient reports no violent behavior    Verbal deficits: None    Patient's response to intervention:  The patient's response to intervention is accepting.    Progress toward goals and other mental status " changes:  The patient's progress toward goals is fair .    Diagnosis:   Anxiety Disorder NOS    Plan:  individual psychotherapy     Return to clinic: Monthly    Length of Service (minutes): 45

## 2019-01-11 ENCOUNTER — TELEPHONE (OUTPATIENT)
Dept: SPEECH THERAPY | Facility: HOSPITAL | Age: 34
End: 2019-01-11

## 2019-01-11 ENCOUNTER — LAB VISIT (OUTPATIENT)
Dept: LAB | Facility: HOSPITAL | Age: 34
End: 2019-01-11
Attending: INTERNAL MEDICINE
Payer: COMMERCIAL

## 2019-01-11 ENCOUNTER — OFFICE VISIT (OUTPATIENT)
Dept: GASTROENTEROLOGY | Facility: CLINIC | Age: 34
End: 2019-01-11
Payer: COMMERCIAL

## 2019-01-11 VITALS
DIASTOLIC BLOOD PRESSURE: 79 MMHG | BODY MASS INDEX: 32.99 KG/M2 | WEIGHT: 205.25 LBS | HEART RATE: 74 BPM | HEIGHT: 66 IN | SYSTOLIC BLOOD PRESSURE: 123 MMHG

## 2019-01-11 DIAGNOSIS — R19.7 DIARRHEA, UNSPECIFIED TYPE: ICD-10-CM

## 2019-01-11 DIAGNOSIS — R14.0 BLOATING: ICD-10-CM

## 2019-01-11 DIAGNOSIS — R13.10 DYSPHAGIA, UNSPECIFIED TYPE: ICD-10-CM

## 2019-01-11 DIAGNOSIS — R09.A2 GLOBUS SENSATION: Primary | ICD-10-CM

## 2019-01-11 DIAGNOSIS — K21.9 GASTROESOPHAGEAL REFLUX DISEASE, ESOPHAGITIS PRESENCE NOT SPECIFIED: ICD-10-CM

## 2019-01-11 DIAGNOSIS — R10.9 ABDOMINAL PAIN, UNSPECIFIED ABDOMINAL LOCATION: ICD-10-CM

## 2019-01-11 LAB
BASOPHILS # BLD AUTO: 0.04 K/UL
BASOPHILS NFR BLD: 0.4 %
DIFFERENTIAL METHOD: ABNORMAL
EOSINOPHIL # BLD AUTO: 0.3 K/UL
EOSINOPHIL NFR BLD: 3 %
ERYTHROCYTE [DISTWIDTH] IN BLOOD BY AUTOMATED COUNT: 12.2 %
HCT VFR BLD AUTO: 44.7 %
HGB BLD-MCNC: 15.1 G/DL
IGE SERPL-ACNC: 98 IU/ML
IMM GRANULOCYTES # BLD AUTO: 0.03 K/UL
IMM GRANULOCYTES NFR BLD AUTO: 0.3 %
LYMPHOCYTES # BLD AUTO: 2.6 K/UL
LYMPHOCYTES NFR BLD: 29.1 %
MCH RBC QN AUTO: 30.9 PG
MCHC RBC AUTO-ENTMCNC: 33.8 G/DL
MCV RBC AUTO: 92 FL
MONOCYTES # BLD AUTO: 0.5 K/UL
MONOCYTES NFR BLD: 5.9 %
NEUTROPHILS # BLD AUTO: 5.5 K/UL
NEUTROPHILS NFR BLD: 61.3 %
NRBC BLD-RTO: 0 /100 WBC
PLATELET # BLD AUTO: 406 K/UL
PMV BLD AUTO: 7.9 FL
RBC # BLD AUTO: 4.88 M/UL
WBC # BLD AUTO: 8.95 K/UL

## 2019-01-11 PROCEDURE — 82785 ASSAY OF IGE: CPT

## 2019-01-11 PROCEDURE — 99215 OFFICE O/P EST HI 40 MIN: CPT | Mod: S$GLB,,, | Performed by: INTERNAL MEDICINE

## 2019-01-11 PROCEDURE — 99215 PR OFFICE/OUTPT VISIT, EST, LEVL V, 40-54 MIN: ICD-10-PCS | Mod: S$GLB,,, | Performed by: INTERNAL MEDICINE

## 2019-01-11 PROCEDURE — 99999 PR PBB SHADOW E&M-EST. PATIENT-LVL IV: ICD-10-PCS | Mod: PBBFAC,,, | Performed by: INTERNAL MEDICINE

## 2019-01-11 PROCEDURE — 36415 COLL VENOUS BLD VENIPUNCTURE: CPT

## 2019-01-11 PROCEDURE — 3008F BODY MASS INDEX DOCD: CPT | Mod: CPTII,S$GLB,, | Performed by: INTERNAL MEDICINE

## 2019-01-11 PROCEDURE — 85025 COMPLETE CBC W/AUTO DIFF WBC: CPT

## 2019-01-11 PROCEDURE — 3008F PR BODY MASS INDEX (BMI) DOCUMENTED: ICD-10-PCS | Mod: CPTII,S$GLB,, | Performed by: INTERNAL MEDICINE

## 2019-01-11 PROCEDURE — 99999 PR PBB SHADOW E&M-EST. PATIENT-LVL IV: CPT | Mod: PBBFAC,,, | Performed by: INTERNAL MEDICINE

## 2019-01-11 RX ORDER — AMITRIPTYLINE HYDROCHLORIDE 50 MG/1
50 TABLET, FILM COATED ORAL NIGHTLY
Qty: 30 TABLET | Refills: 6 | Status: SHIPPED | OUTPATIENT
Start: 2019-01-11 | End: 2019-09-05 | Stop reason: SDUPTHER

## 2019-01-11 RX ORDER — RIZATRIPTAN BENZOATE 10 MG/1
TABLET ORAL
COMMUNITY
End: 2021-11-03

## 2019-01-11 NOTE — PATIENT INSTRUCTIONS
-Follow up with Dr. Dash to work on diaphragmatic breathing to help with rumination syndrome and hypervigilance that is likely contributing to your globus sensation.

## 2019-01-11 NOTE — PROGRESS NOTES
[facility] Consultation Note    Reason for Consult:    Chief Complaint   Patient presents with    Heartburn    Dysphagia    Gas    Bloated    Diarrhea         PCP:   Primary Doctor No       Referring MD:  Abdelrahman Yates Md  [address], [address]  [place] [address]    HPI:  Lou Lucio is a 33 y.o. female with a PMH of HTN referred to motility clinic for second opinion regarding the following problems:    GERD.    Pyrosis significantly improved. Pyrosis about once per week or less  Improved.  Still having some heartburn. Has symptoms few times per week.  Sx get worse if forgets ppi.   Takes pantoprazole 40mg BID   Takes zantac 150mg prn - about once per week   Not taking carafate   Improved w amitriptyline 20mg QHS    Rumination.  Significantly improved w diaphragmatic breathing.   Regurgitation still occurs few times per day. Occurs w belching at times.  Able to suppress it with diaphragmatic breathing        Globus sensation. Feeling of lump in her throat.    Improved w CBD and diaphragmatic breathing, but still having some.  Has daily symptoms in am.  Gets better with eating.     No dysphagia to solids and liquids.  Feels gurgling when she wakes up almost every day.    This is now the most bothersome problem    Nausea. Rare, only when eats something heavy.   No early satiety   Onset: 2012  Vomiting.  none   Takes zofran prn - rarely needed    Abdominal pain. None     Gas and bloating. Occasional    Diarrhea. Occasional loose stools.   Brule 4-5 to 5-6. Bm  Daily     H/o c diff in 2012. After abx course for salmonella. Treated with PO abx. Resolved.     Blood in stool. Resolved.     Appendicitis 11/2018 that required surgical resection.      Migraines. Takes relpax 40 mg PRN. Once to twice monthly. H/o Bell's palsy in 2013. treated with lyrica and steroids x few weeks. Seeing neurology since 2008.    Anxiety. Depression. Does not feel depressed or anxious.   Stress is under better control.    Following w Dr. Dash every other week.   Doing yoga 2-3 per week, breathing excercise several times per week      Denies dysphagia, constipation,  melena, weight loss, trouble sleeping    Total visit time was 40 minutes, more than 50% of which was spent in face-to-face counseling with patient regarding symptoms, diagnostic results, prognosis, risks and benefits of treatment options, instructions for management, importance of compliance with chosen treatment options, risk factor reduction, stress reduction, coping strategies.      Previous Studies:   Esophageal Manometry 5/17/18: No Roseland Classification Abnormality.  Complete bolus clearance.  No Hiatal hernia.  Normal multiple rapid swallows test.  No signioficant changes with provocative maneuvers  Aerophagia. Rumination Syndrome.    PH impedence 5/17/18: Significant acid reflux on PPI BID based on pH data.  DeMeester Score (<14.7): 46. Total percent time pH less than 4 (< 4.5; <1 if PPI BID): 13.4.  Weakly acidic reflux on ppi.  No non-acidic reflux.  Good correlation between heartburn and weakly acid episodes.  Good correlation between regurgitation and acidic and weakly acidic reflux episodes. Results may be affected by presence of rumination syndrome documented on esophageal manometry.    GES 5/4/18: NL. 4% retention at 4 hrs  EGD 4/30/18:NL esophagus (scattered intraepithelial lymphocytes and reactive change). Gastric erythema (reactive gastropathy). NL duodenum (-).  Gastric pH 4.    Colon 4/30/18:GPTTI. Sigmoid tics. NL colon (-). Rectal erythema (-). Non bleeding int hemorrhoid. NL TI (-). Rpt at 50 yrs old for screening.   CT abd/pelivs 4/14/18: ovarian cysts. Fatty liver. Appendicoliths.   EGD 6/9/17: NL esophagus. STRETTA. Small HH. NL stomach. NL duodenum.   EGD 2/17/17:ringed esophagus and feline appearence in middle third esophagus (no tissue). Diffuse mild gastric erythema (-). Nl duodenum.  Esophageal manometry  3/9/17: distal esophageal spasm, no EGJ outflow obstruction.  IRP NL 10.9; DCI NL 1840.2; DL -2.5?; 8% failed swallows, 8% pan esophageal pressurization, 58% premature contractions, no HH.  Personally reviewed by Dr. Overton   PH impedence off meds 3/9/17:  Elevated DeMester score (47.8) indicates severe reflux. 13.5% time in reflux: 24 % upright, 3.3 % supine, 13.5% postprandial.  SI/SAP: heartburn 100.0/99.9; CP 96.2/100.0; regur 96.4/100.0; belch 100.0/98.6;  100% proximal esophageal reflux.  Personally reviewed by Dr. Overton   *EGD/colon 2012:pt reports c.diff. normal procedures.    *per referring provider note/pt report    LABS:  pEos H  IgE H  TTg/IgA-  TSH nl     ROS:  ROS   Constitutional: No fevers, no chills, no night sweats, no weight loss  ENT: No congestion, no rhinorrhea, + chronic sinus problems  CV: No chest pain, no palpitations  Pulm: no cough, no shortness of breath  Ophtho: No blurry vision, no eye redness  GI: see HPI  Derm: No rash  Heme: No lymphadenopathy, no bruising  MSK: No arthritis, no joint swelling, no Raynauds  : No dysuria, no frequent urination, no blood in urine  Endo: No hot or cold intolerance  Neuro: No dizziness, no syncope, no seizure  Psych: No anxiety, no depression        Medical History:   Past Medical History:   Diagnosis Date    C. difficile colitis     GERD (gastroesophageal reflux disease)     Hypertension     Irritable bowel syndrome     Migraines         Surgical History:   Past Surgical History:   Procedure Laterality Date    24 HOUR PH WITH IMPEDANCE (ON REFLUX MED) N/A 5/17/2018    Performed by Sirisha [facility]ton MD at [facility] (4TH FLR)    APPENDECTOMY      COLONOSCOPY  2012    Colonoscopy N/A 4/30/2018    Performed by Sirisha [facility] MD at [facility] (4TH FLR)    ESOPHAGOGASTRODUODENOSCOPY  2017    ESOPHAGOGASTRODUODENOSCOPY (EGD) N/A 4/30/2018    Performed by Sirisha [facility]ton MD at [facility] (4TH FLR)    MANOMETRY-ESOPHAGEAL-WITH IMPEDANCE N/A  5/17/2018    [facility] by [facility] at [facility] ([facility])        Family History:   Family History   Problem Relation Age of Onset    Lung cancer Maternal Grandfather     Esophageal cancer Paternal Grandfather 50        smoker, drinker    Birth defects Paternal Grandfather 50        esophageal    Colon cancer Neg Hx     Crohn's disease Neg Hx     Inflammatory bowel disease Neg Hx     Irritable bowel syndrome Neg Hx     Liver cancer Neg Hx     Rectal cancer Neg Hx     Stomach cancer Neg Hx     Celiac disease Neg Hx     Cystic fibrosis Neg Hx     Hemochromatosis Neg Hx     Ulcerative colitis Neg Hx     Liver disease Neg Hx     Christian's disease Neg Hx     Lymphoma Neg Hx     Tuberculosis Neg Hx     Rheum arthritis Neg Hx     Scleroderma Neg Hx     Melanoma Neg Hx     Multiple sclerosis Neg Hx     Lupus Neg Hx     Psoriasis Neg Hx     Skin cancer Neg Hx         Social History:   Social History     Socioeconomic History    Marital status:      Spouse name: None    Number of children: None    Years of education: None    Highest education level: None   Social Needs    Financial resource strain: None    Food insecurity - worry: None    Food insecurity - inability: None    Transportation needs - medical: None    Transportation needs - non-medical: None   Occupational History    None   Tobacco Use    Smoking status: Never Smoker    Smokeless tobacco: Never Used   Substance and Sexual Activity    Alcohol use: Yes     Comment: less than weekly    Drug use: No    Sexual activity: None   Other Topics Concern    None   Social History Narrative    None        Review of patient's allergies indicates:  Allergies not on file    Current Outpatient Medications   Medication Sig Dispense Refill    BYSTOLIC 5 mg Tab TK 1 T PO QD IN THE EMEKA  5    eletriptan (RELPAX) 40 MG tablet Take 40 mg by mouth as needed. may repeat in 2 hours if necessary      ergocalciferol (ERGOCALCIFEROL)  "50,000 unit Cap       metoclopramide HCl (REGLAN) 10 MG tablet       ondansetron (ZOFRAN) 8 MG tablet Take 1 tablet (8 mg total) by mouth every 8 (eight) hours as needed for Nausea. 90 tablet 3    rizatriptan (MAXALT) 10 MG tablet Maxalt 10 mg tablet   Take 1 tablet as needed by oral route as needed for 30 days.      amitriptyline (ELAVIL) 50 MG tablet Take 1 tablet (50 mg total) by mouth every evening. 30 tablet 6    pantoprazole (PROTONIX) 40 MG tablet Take 1 tablet (40 mg total) by mouth 2 (two) times daily. 180 tablet 3     No current facility-administered medications for this visit.         Objective Findings:  Vital Signs:  /79   Pulse 74   Ht 5' 6" (1.676 m)   Wt 93.1 kg (205 lb 4 oz)   BMI 33.13 kg/m²   Body mass index is 33.13 kg/m².    Physical Exam:  General appearance: alert, cooperative, no distress  HENT: Normocephalic, atraumatic, neck symmetrical, no nasal discharge  Eyes: conjunctivae/corneas clear, PERRL, EOM's intact  Lungs: clear to auscultation bilaterally, no dullness to percussion bilaterally  Heart: regular rate and rhythm without rub; no displacement of the PMI  Abdomen: soft, non-tender; bowel sounds normoactive; no organomegaly  Extremities: extremities symmetric; no clubbing, cyanosis, or edema  Integument: Skin color, texture, turgor normal; no rashes; hair distrubution normal  Neurologic: Alert and oriented X 3, normal strength, normal coordination and gait  Psychiatric: no pressured speech; normal affect; no evidence of impaired cognition    Labs:  Lab Results   Component Value Date    WBC 15.12 (H) 04/10/2018    HGB 13.6 04/10/2018    HCT 40.6 04/10/2018    MCV 91 04/10/2018     (H) 04/10/2018     No results found for: FERRITIN  Lab Results   Component Value Date     04/10/2018    K 3.8 04/10/2018     04/10/2018    CO2 25 04/10/2018    GLU 91 04/10/2018    BUN 10 04/10/2018    CREATININE 0.9 04/10/2018    CALCIUM 9.5 04/10/2018    PROT 7.2 04/10/2018 "    ALBUMIN 4.1 04/10/2018    BILITOT 0.6 04/10/2018    ALKPHOS 104 04/10/2018    AST 28 04/10/2018    ALT 30 04/10/2018     Lab Results   Component Value Date    TSH 2.313 04/10/2018     No results found for: SEDRATE  No results found for: CRP  No results found for: LABA1C, HGBA1C        Assessment and Plan:  Lou Lucio is a 33 y.o. female with a PMH of HTN referred to motility clinic for second opinion regarding the following problems:    GERD with pyrosis and regurgitation.  Hiatal hernia.  Belching. Ph impedence off PPIs with severe reflux.  Stretta in 6/2017.  PH impedence with significant acid reflux on PPI BID, mostly upright.  Gastric pH 4.  Very mild improvement after Stretta, unable to wean off PPI.     Manometry w evidence of rumination syndrome.   Significant improvement with diet and diaphragmatic breathing    -Cont protonix 40 mg BID  -Zantact 150mg prn   -Continue behavioral treatment of rumination syndrome      Rumination syndrome demonstrated on manometry.  Likely related to GERD and belching   -Fu w psychology   -Cont diaphragmatic breathing   -Continue yoga/medication.      Globus sensation that improves w eating.  Gargling sensation in cervical esophagus. No dysphagia. Significantly improved   -Increase amitriptyline 50mg QHS   -Follow up with psychology to work on behavioral strategies to decrease hypervigilance about symptoms     Ringed esophagus on previous EGD (insuficint tissue) at [facility].  Elevated IgE and peripheral Eos.  Negative biopsies on PPI at [facility].  Denies dysphagia   -Repeat CBC w diff and IgE    Distal esophageal spasm on manometry at [facility].  No Claude Classification abnormality at [facility].  Denies dysphagia or chest pain.      Nausea. Rare vomiting. Normal GES.  Now significantly improved.   -Zofran 8 mg q8hr PRN    Abdominal pain. IBS diagnosed in 2012.  Significantly improved.   Some improvement with levsin PRN in the past    Gas and bloating. Distention.  Significantly improved.      Avoids artificial sugars and lactose    Diarrhea. Few episodes of severe diarrhea with nocturnal symptoms and incontinence.  No celiac.  Negative colonoscopy.  Significantly improved   -Questran 1 g QID PRN.  Recently not needed     Recurrent episodes of nausea, vomiting, abdominal pain and severe diarrhea lasting a day (3-4 times in the past 2 years).  No recent episodes   -Check LTS, lipase, US during episode    H/o salmonella and c diff in 2012. Treated with PO abx with complete resolution    Blood in stool.  Hemorrhoids on colonoscopy.  Now resolved.     Fatty liver. Fibrosis:F 0-1.  Previously seen by hepatology     Anxiety. Depression.  Stress.  Mostly related to GI symptoms. Works as an .  Improved w behavioral measures    -Continue to follow with Dr. Regina Dash for CBT/mindfulness based therapy/stress reduction training/diaphragmatic breathing. Will discuss strategies to decrease hypervigilance about globus symptoms   -Continue stress reduction, relaxation, yoga, diaphragmatic breathing       Migraines.   Well controlled with relpax PRN.  Followed by neurology     Elevated WBC/plt  -Will repeat     Follow-up in about 4 months (around 5/11/2019) for Dr. Overton.    1. Globus sensation    2. Dysphagia, unspecified type    3. Gastroesophageal reflux disease, esophagitis presence not specified    4. Diarrhea, unspecified type    5. Abdominal pain, unspecified abdominal location    6. Bloating          Order summary:  Orders Placed This Encounter    Fl Modified Barium Swallow Speech    FL Esophagram Complete    CBC auto differential    IgE    SLP video swallow    amitriptyline (ELAVIL) 50 MG tablet         Thank you so much for allowing me to participate in the care of Lou Overton MD

## 2019-02-01 ENCOUNTER — OFFICE VISIT (OUTPATIENT)
Dept: PSYCHIATRY | Facility: CLINIC | Age: 34
End: 2019-02-01
Payer: COMMERCIAL

## 2019-02-01 DIAGNOSIS — F41.9 ANXIETY: Primary | ICD-10-CM

## 2019-02-01 PROCEDURE — 90834 PSYTX W PT 45 MINUTES: CPT | Mod: S$GLB,,, | Performed by: PSYCHOLOGIST

## 2019-02-01 PROCEDURE — 90834 PR PSYCHOTHERAPY W/PATIENT, 45 MIN: ICD-10-PCS | Mod: S$GLB,,, | Performed by: PSYCHOLOGIST

## 2019-02-01 NOTE — PROGRESS NOTES
Individual Psychotherapy (PhD/LCSW)    2/1/2019    Site:  [facility]         Therapeutic Intervention: Met with patient.  Outpatient - Insight oriented psychotherapy 45 min - CPT code 62621    Chief complaint/reason for encounter: anxiety     Interval history and content of current session: Pt reports that she is doing well. She had a recent appointment with Dr. Overton, who told her that her globus sensation is likely worsening due to hypervigilance of symptoms and she asked to work on this today, even though the sensation has gone away completely in the past 2 weeks since increasing her Elavil. We spoke about how hypervigilance might emerge, through a narrowing of focus both cognitively and behaviorally, and discussed the development of a mindfulness practice as an antidote to this. Gave pt some basic training in mindfulness strategies and encouraged her to practice; also provided her with several websites that could help (Calm and Headspace). Also encouraged pt to ride out urges to drink water when she notices this sensation, as she does so as a way to get rid of it but it never works and only serves to bring more attention to the area. Pt then spoke about an angering situation at work, and how our earlier sessions on anger management were useful to her as she remembered some of the principles we discussed and brought them into her situation at the time.    Treatment plan:  · Target symptoms: anxiety   · Why chosen therapy is appropriate versus another modality: relevant to diagnosis, patient responds to this modality  · Outcome monitoring methods: self-report, observation  · Therapeutic intervention type: insight oriented psychotherapy    Risk parameters:  Patient reports no suicidal ideation  Patient reports no homicidal ideation  Patient reports no self-injurious behavior  Patient reports no violent behavior    Verbal deficits: None    Patient's response to intervention:  The patient's response to  intervention is accepting.    Progress toward goals and other mental status changes:  The patient's progress toward goals is fair .    Diagnosis:   Anxiety Disorder NOS    Plan:  individual psychotherapy     Return to clinic: Monthly    Length of Service (minutes): 45

## 2019-02-12 ENCOUNTER — TELEPHONE (OUTPATIENT)
Dept: RADIOLOGY | Facility: HOSPITAL | Age: 34
End: 2019-02-12

## 2019-02-13 ENCOUNTER — HOSPITAL ENCOUNTER (OUTPATIENT)
Dept: RADIOLOGY | Facility: HOSPITAL | Age: 34
Discharge: HOME | End: 2019-02-13
Attending: INTERNAL MEDICINE
Payer: COMMERCIAL

## 2019-02-13 ENCOUNTER — CLINICAL SUPPORT (OUTPATIENT)
Dept: SPEECH THERAPY | Facility: HOSPITAL | Age: 34
End: 2019-02-13
Attending: INTERNAL MEDICINE
Payer: COMMERCIAL

## 2019-02-13 DIAGNOSIS — R09.A2 GLOBUS SENSATION: ICD-10-CM

## 2019-02-13 PROCEDURE — 74230 FL MODIFIED BARIUM SWALLOW SPEECH STUDY: ICD-10-PCS | Mod: 26,,, | Performed by: RADIOLOGY

## 2019-02-13 PROCEDURE — 74220 X-RAY XM ESOPHAGUS 1CNTRST: CPT | Mod: TC

## 2019-02-13 PROCEDURE — 74230 X-RAY XM SWLNG FUNCJ C+: CPT | Mod: 26,,, | Performed by: RADIOLOGY

## 2019-02-13 PROCEDURE — 74230 X-RAY XM SWLNG FUNCJ C+: CPT | Mod: TC

## 2019-02-13 PROCEDURE — 92611 MOTION FLUOROSCOPY/SWALLOW: CPT | Mod: GN | Performed by: SPEECH-LANGUAGE PATHOLOGIST

## 2019-02-14 NOTE — PROGRESS NOTES
I was present for the entire study and agree with findings and recommendations.  Not clear if the prominence consistent with a cricopharyngeal bar may be related to her globus sensation as it did not appear obstructive on this study.

## 2019-02-14 NOTE — PLAN OF CARE
Assessment / Impressions      The results of this MBSS indicate that patient demonstrates oropharyngeal swallowing function appropriate for a full PO diet w/o significant restriction.  No aspiration observed.      Recommendations / POC   -Diet: recommend regular consistency and thin liquids, as tolerated   -Contact clinician or referring provider for repeat MBSS if swallowing status changes or worsens  - Follow up with Dr. Overton as directed.

## 2019-02-14 NOTE — PROGRESS NOTES
"Referring provider: Dr. Sirisha Overton  Reason for visit:  Modified barium swallow study (CPT 17719)    Report Summary   --Date: 02/14/2019  --Purpose: to evaluate anatomy and physiology of the oropharyngeal swallow, to determine effectiveness of rehabilitation strategies, and to determine diet consistency and intervention recommendations.   --Diet recommendations: regular consistency and thin liquids    Subjective / History    Lou Lucio is a 34 y.o. female referred by Dr. Overton for Modified Barium Swallow Study (08721).  She is currently on a regular/thin liquid diet.  She reports "no swallowing issues," but does state that she suffers from acid reflux (both acidic and non-acidic), has a diagnosis of rumination syndrome and has a feeling of a "globus" in her throat "all of the time, 24/7."  Additionally, she reports a gurgling noise when she wakes up every morning, and says that when she bends over sometimes, liquid comes up.     Past Medical History:   Diagnosis Date    C. difficile colitis     GERD (gastroesophageal reflux disease)     Hypertension     Irritable bowel syndrome     Migraines      Current Outpatient Medications on File Prior to Visit   Medication Sig Dispense Refill    amitriptyline (ELAVIL) 50 MG tablet Take 1 tablet (50 mg total) by mouth every evening. 30 tablet 6    BYSTOLIC 5 mg Tab TK 1 T PO QD IN THE EMEKA  5    eletriptan (RELPAX) 40 MG tablet Take 40 mg by mouth as needed. may repeat in 2 hours if necessary      ergocalciferol (ERGOCALCIFEROL) 50,000 unit Cap       metoclopramide HCl (REGLAN) 10 MG tablet       ondansetron (ZOFRAN) 8 MG tablet Take 1 tablet (8 mg total) by mouth every 8 (eight) hours as needed for Nausea. 90 tablet 3    pantoprazole (PROTONIX) 40 MG tablet Take 1 tablet (40 mg total) by mouth 2 (two) times daily. 180 tablet 3    rizatriptan (MAXALT) 10 MG tablet Maxalt 10 mg tablet   Take 1 tablet as needed by oral route as needed for 30 days.       No " current facility-administered medications on file prior to visit.        Objective   Lateral videofluorographic views were obtained. The radiologist and speech pathologist were present for the entire procedure.     Consistencies assessed / method of administration  Thin liquid/5 mL  Thin liquid/cup sip  Thin liquid/sequential cup sips  Applesauce/tsp  Pudding/tsp  Cracker  Barium tablet w/ water    Oral phase  - Grossly WNL with adequate/timely lip closure, tongue control during bolus hold, bolus preparation, and bolus transport/lingual motion.  Little to no oral residue.      Pharyngeal phase  - timely initiation  - adequate soft palate elevation  - adequate laryngeal elevation and anterior hyoid excursion  - complete tongue base retraction  - adequate epiglottic movement  - mildly decreased laryngeal vestibular closure: minimal flash penetration on consecutive cup sips of thin liquid   - adequate pharyngeal stripping wave  - mild pharyngeal residue: at BOT on consecutive cup sips of thin liquids and on puree and pudding; cleared with prompted dry swallow  - timely PE segment opening     Esophageal phase:  - a prominence consistent with a cricopharyngeal bar was noted the level of C3-C4.       Rosenbek Penetration-Aspiration Scale (Rosenbek et al 1996)  Best Trial: 1. Contrast does not enter airway.   Worst Trial: 2. Contrast enters airway but remains above TVF, no residue.       Assessment / Impressions     The results of this MBSS indicate that patient demonstrates oropharyngeal swallowing function appropriate for a full PO diet w/o significant restriction.  No aspiration observed.     Recommendations / POC   -Diet: recommend regular consistency and thin liquids, as tolerated   -Contact clinician or referring provider for repeat MBSS if swallowing status changes or worsens  - Follow up with Dr. Overton as directed.

## 2019-02-25 ENCOUNTER — PATIENT MESSAGE (OUTPATIENT)
Dept: PSYCHIATRY | Facility: CLINIC | Age: 34
End: 2019-02-25

## 2019-03-11 ENCOUNTER — PATIENT MESSAGE (OUTPATIENT)
Dept: PSYCHIATRY | Facility: CLINIC | Age: 34
End: 2019-03-11

## 2019-03-22 ENCOUNTER — OFFICE VISIT (OUTPATIENT)
Dept: PSYCHIATRY | Facility: CLINIC | Age: 34
End: 2019-03-22
Payer: COMMERCIAL

## 2019-03-22 DIAGNOSIS — F41.9 ANXIETY: Primary | ICD-10-CM

## 2019-03-22 PROCEDURE — 90834 PR PSYCHOTHERAPY W/PATIENT, 45 MIN: ICD-10-PCS | Mod: S$GLB,,, | Performed by: PSYCHOLOGIST

## 2019-03-22 PROCEDURE — 90834 PSYTX W PT 45 MINUTES: CPT | Mod: S$GLB,,, | Performed by: PSYCHOLOGIST

## 2019-03-22 NOTE — PROGRESS NOTES
"Individual Psychotherapy (PhD/LCSW)    3/22/2019    Site:  [facility]         Therapeutic Intervention: Met with patient.  Outpatient - Insight oriented psychotherapy 45 min - CPT code 91331    Chief complaint/reason for encounter: anxiety     Interval history and content of current session: Pt reports that she is doing well. She had a wonderful and relaxing trip to [facility], where she noticed no problems with globus sensation at all, and she reports that it was striking and obvious how quick the sensation returned when she came back to [place]. She states that besides being away from work, she noticed that she walked a lot during her time away and that this improved how she felt physically and emotionally. She is interested in bringing a walking routine into her life at home, but continues to fall into "all or nothing" mindset when it comes to exercise, and to fall into habitual patterns of "lying around and doing nothing". Pt is concerned about possible depression due to lack of energy and feelings of fatigue based on internet reading she has done, but brief exploration confirms that at this point she is not depressed, just overworked and having different priorities that she has in the past. Discussed how pt can reset habits through cognitive and behavioral strategies, as well as become more cognizant of unhealthy thinking patterns so that she can engage in different narratives with herself.    Treatment plan:  · Target symptoms: anxiety   · Why chosen therapy is appropriate versus another modality: relevant to diagnosis, patient responds to this modality  · Outcome monitoring methods: self-report, observation  · Therapeutic intervention type: insight oriented psychotherapy    Risk parameters:  Patient reports no suicidal ideation  Patient reports no homicidal ideation  Patient reports no self-injurious behavior  Patient reports no violent behavior    Verbal deficits: None    Patient's response to " intervention:  The patient's response to intervention is accepting.    Progress toward goals and other mental status changes:  The patient's progress toward goals is fair .    Diagnosis:   Anxiety Disorder NOS    Plan:  individual psychotherapy     Return to clinic: Monthly    Length of Service (minutes): 45

## 2019-04-01 ENCOUNTER — PATIENT MESSAGE (OUTPATIENT)
Dept: PSYCHIATRY | Facility: CLINIC | Age: 34
End: 2019-04-01

## 2019-04-03 ENCOUNTER — PATIENT MESSAGE (OUTPATIENT)
Dept: PSYCHIATRY | Facility: CLINIC | Age: 34
End: 2019-04-03

## 2019-04-26 ENCOUNTER — OFFICE VISIT (OUTPATIENT)
Dept: PSYCHIATRY | Facility: CLINIC | Age: 34
End: 2019-04-26
Payer: COMMERCIAL

## 2019-04-26 DIAGNOSIS — F41.9 ANXIETY: Primary | ICD-10-CM

## 2019-04-26 PROCEDURE — 90834 PR PSYCHOTHERAPY W/PATIENT, 45 MIN: ICD-10-PCS | Mod: S$GLB,,, | Performed by: PSYCHOLOGIST

## 2019-04-26 PROCEDURE — 90834 PSYTX W PT 45 MINUTES: CPT | Mod: S$GLB,,, | Performed by: PSYCHOLOGIST

## 2019-04-26 NOTE — PROGRESS NOTES
Individual Psychotherapy (PhD/LCSW)    4/26/2019    Site:  [facility]         Therapeutic Intervention: Met with patient.  Outpatient - Insight oriented psychotherapy 45 min - CPT code 37719    Chief complaint/reason for encounter: anxiety     Interval history and content of current session: Pt reports that she continues to do very well. She reports that she is the most content she has been in a while, and feels that her physical and mental health are as good as they have every been. She is feeling very hopeful and optimistic. She reflects on the various skills she is using that have helped propel her to this place, including regular mindfulness, diaphragmatic breathing, better work-life balance, and improved emotional regulation tools (I.e. Opposite action and learning how to make decisions before reacting). She has also since last session reduced the behaviors that were contributing to her hyperfocus on her bodily sensations. She is thinking more about her future now. She reflects on how much her stress had contributed to her poor health, and we discussed that her flexibility in trying new things and allowing new ideas has helped tremendously. Discussed future need for therapy - since pt has been doing well for months now, we have decided to move into a maintenance phase of treatment and will schedule a session for 3 months from now. Encouraged pt to be in touch prior to that if needed.    Treatment plan:  · Target symptoms: anxiety   · Why chosen therapy is appropriate versus another modality: relevant to diagnosis, patient responds to this modality  · Outcome monitoring methods: self-report, observation  · Therapeutic intervention type: insight oriented psychotherapy    Risk parameters:  Patient reports no suicidal ideation  Patient reports no homicidal ideation  Patient reports no self-injurious behavior  Patient reports no violent behavior    Verbal deficits: None    Patient's response to  intervention:  The patient's response to intervention is accepting.    Progress toward goals and other mental status changes:  The patient's progress toward goals is fair .    Diagnosis:   Anxiety Disorder NOS    Plan:  individual psychotherapy     Return to clinic: Three months    Length of Service (minutes): 45

## 2019-05-13 ENCOUNTER — OFFICE VISIT (OUTPATIENT)
Dept: GASTROENTEROLOGY | Facility: CLINIC | Age: 34
End: 2019-05-13
Payer: COMMERCIAL

## 2019-05-13 VITALS
BODY MASS INDEX: 34.61 KG/M2 | HEART RATE: 94 BPM | WEIGHT: 215.38 LBS | SYSTOLIC BLOOD PRESSURE: 111 MMHG | HEIGHT: 66 IN | DIASTOLIC BLOOD PRESSURE: 82 MMHG

## 2019-05-13 DIAGNOSIS — R11.10 RUMINATION SYNDROME OF INGESTED FOOD IN ADULT: Primary | ICD-10-CM

## 2019-05-13 DIAGNOSIS — R19.7 DIARRHEA, UNSPECIFIED TYPE: ICD-10-CM

## 2019-05-13 DIAGNOSIS — R10.9 ABDOMINAL PAIN, UNSPECIFIED ABDOMINAL LOCATION: ICD-10-CM

## 2019-05-13 DIAGNOSIS — K21.9 GASTROESOPHAGEAL REFLUX DISEASE, ESOPHAGITIS PRESENCE NOT SPECIFIED: ICD-10-CM

## 2019-05-13 DIAGNOSIS — R09.A2 GLOBUS PHARYNGEUS: ICD-10-CM

## 2019-05-13 DIAGNOSIS — R14.0 BLOATING: ICD-10-CM

## 2019-05-13 PROBLEM — F50.84 RUMINATION SYNDROME OF INGESTED FOOD IN ADULT: Status: ACTIVE | Noted: 2019-05-13

## 2019-05-13 PROCEDURE — 99999 PR PBB SHADOW E&M-EST. PATIENT-LVL IV: CPT | Mod: PBBFAC,,, | Performed by: INTERNAL MEDICINE

## 2019-05-13 PROCEDURE — 99214 OFFICE O/P EST MOD 30 MIN: CPT | Mod: S$GLB,,, | Performed by: INTERNAL MEDICINE

## 2019-05-13 PROCEDURE — 3008F PR BODY MASS INDEX (BMI) DOCUMENTED: ICD-10-PCS | Mod: CPTII,S$GLB,, | Performed by: INTERNAL MEDICINE

## 2019-05-13 PROCEDURE — 99214 PR OFFICE/OUTPT VISIT, EST, LEVL IV, 30-39 MIN: ICD-10-PCS | Mod: S$GLB,,, | Performed by: INTERNAL MEDICINE

## 2019-05-13 PROCEDURE — 3008F BODY MASS INDEX DOCD: CPT | Mod: CPTII,S$GLB,, | Performed by: INTERNAL MEDICINE

## 2019-05-13 PROCEDURE — 99999 PR PBB SHADOW E&M-EST. PATIENT-LVL IV: ICD-10-PCS | Mod: PBBFAC,,, | Performed by: INTERNAL MEDICINE

## 2019-05-13 RX ORDER — ASCORBIC ACID 500 MG
500 TABLET ORAL DAILY
COMMUNITY
End: 2024-03-12

## 2019-05-13 RX ORDER — CYANOCOBALAMIN (VITAMIN B-12) 1000MCG/15
LIQUID (ML) ORAL
COMMUNITY
End: 2021-11-03

## 2019-05-13 NOTE — PATIENT INSTRUCTIONS
-Establish with a primary care doctor.  Discuss mildly elevated platelets during your next appointment

## 2019-05-13 NOTE — PROGRESS NOTES
[facility] Consultation Note    Reason for Consult:    Chief Complaint   Patient presents with    Heartburn    Gas    Diarrhea    Bloated    Nausea    rumination         PCP:   Primary Doctor No       Referring MD:  Abdelrahman Yates Md  [address], [address]  [place] [address]    HPI:  Lou Lucio is a 34 y.o. female with a PMH of HTN referred to motility clinic for second opinion regarding the following problems:    GERD.  Imrpoved.   Pyrosis doing well. Few times per month   Regurgitation.  Not common.   Gets sx when forgets ppi.   Takes pantoprazole 40mg BID- sometimes daily   Takes zantac 150mg prn - rare   Not taking carafate   Amitriptyline 50mg QHS w significant improvement     Rumination.    Occasional at this point.  Few times per week.  Significantly less frequent.   Works on multiple times per day on diaphragmatic breathing, which makes a big difference.   Able to suppress her symptoms with diaphragmatic breathing        Globus sensation. Feeling of lump in her throat now only occasional   Resolved w TCA dn CBT  No dysphagia to solids and liquids.  Feels gurgling occasionally. MBS neagative   No longer a major problem     Nausea. Rare   No early satiety.  none  Onset: 2012  Vomiting.  none   Takes zofran prn - not needed anymore     Abdominal pain. None     Gas and bloating. Occasional    Diarrhea. Resolved   Wood[place] 4  Daily to BID    H/o c diff in 2012. After abx course for salmonella. Treated with PO abx. Resolved.     Blood in stool. Resolved.     Appendicitis 11/2018 that required surgical resection.      Migraines. Takes relpax 40 mg PRN. Once to twice monthly. H/o Bell's palsy in 2013. treated with lyrica and steroids x few weeks. Seeing neurology since 2008.    Anxiety. Depression. Does not feel depressed or anxious.  Stress well controlled  Following w Dr. Dash every other week.   This is the best I felt in a very long time  Not doing yoga  as often.  Still doing breathing excercise      Denies dysphagia, constipation,  melena, weight loss, trouble sleeping    Total visit time was 40 minutes, more than 50% of which was spent in face-to-face counseling with patient regarding symptoms, diagnostic results, prognosis, risks and benefits of treatment options, instructions for management, importance of compliance with chosen treatment options, risk factor reduction, stress reduction, coping strategies.      Previous Studies:   MBS 2/14/2019: patient demonstrates oropharyngeal swallowing function appropriate for a full PO diet w/o significant restriction.  No aspiration observed.   Esophagogram 2/13/2019: Normal examination.  Barium pill passed through the esophagus and the GE junction smoothly.  Esophageal Manometry 5/17/18: No Troy Classification Abnormality.  Complete bolus clearance.  No Hiatal hernia.  Normal multiple rapid swallows test.  No signioficant changes with provocative maneuvers  Aerophagia. Rumination Syndrome.    PH impedence 5/17/18: Significant acid reflux on PPI BID based on pH data.  DeMeester Score (<14.7): 46. Total percent time pH less than 4 (< 4.5; <1 if PPI BID): 13.4.  Weakly acidic reflux on ppi.  No non-acidic reflux.  Good correlation between heartburn and weakly acid episodes.  Good correlation between regurgitation and acidic and weakly acidic reflux episodes. Results may be affected by presence of rumination syndrome documented on esophageal manometry.    GES 5/4/18: NL. 4% retention at 4 hrs  EGD 4/30/18:NL esophagus (scattered intraepithelial lymphocytes and reactive change). Gastric erythema (reactive gastropathy). NL duodenum (-).  Gastric pH 4.    Colon 4/30/18:GPTTI. Sigmoid tics. NL colon (-). Rectal erythema (-). Non bleeding int hemorrhoid. NL TI (-). Rpt at 50 yrs old for screening.   CT abd/pelivs 4/14/18: ovarian cysts. Fatty liver. Appendicoliths.   EGD 6/9/17: NL esophagus. STRETTA. Small HH. NL stomach. NL  duodenum.   EGD 2/17/17:ringed esophagus and feline appearence in middle third esophagus (no tissue). Diffuse mild gastric erythema (-). Nl duodenum.  Esophageal manometry 3/9/17: distal esophageal spasm, no EGJ outflow obstruction.  IRP NL 10.9; DCI NL 1840.2; DL -2.5?; 8% failed swallows, 8% pan esophageal pressurization, 58% premature contractions, no HH.  Personally reviewed by Dr. Overton   PH impedence off meds 3/9/17:  Elevated DeMester score (47.8) indicates severe reflux. 13.5% time in reflux: 24 % upright, 3.3 % supine, 13.5% postprandial.  SI/SAP: heartburn 100.0/99.9; CP 96.2/100.0; regur 96.4/100.0; belch 100.0/98.6;  100% proximal esophageal reflux.  Personally reviewed by Dr. Overton   *EGD/colon 2012:pt reports c.diff. normal procedures.    *per referring provider note/pt report    LABS:  pEos H  IgE H  TTg/IgA-  TSH nl     ROS:  ROS   Constitutional: No fevers, no chills, no night sweats, no weight loss  ENT: + congestion, + rhinorrhea, + chronic sinus problems  CV: No chest pain, no palpitations  Pulm: no cough, no shortness of breath  Ophtho: No blurry vision, no eye redness  GI: see HPI  Derm: No rash  Heme: No lymphadenopathy, no bruising  MSK: No arthritis, no joint swelling, no Raynauds  : No dysuria, no frequent urination, no blood in urine  Endo: No hot or cold intolerance  Neuro: No dizziness, no syncope, no seizure  Psych: No anxiety, no depression        Medical History:   Past Medical History:   Diagnosis Date    C. difficile colitis     GERD (gastroesophageal reflux disease)     Hypertension     Irritable bowel syndrome     Migraines         Surgical History:   Past Surgical History:   Procedure Laterality Date    24 HOUR PH WITH IMPEDANCE (ON REFLUX MED) N/A 5/17/2018    Performed by [facility] (4TH FLR)    APPENDECTOMY      COLONOSCOPY  2012    Colonoscopy N/A 4/30/2018    [facility] (4TH FLR)     ESOPHAGOGASTRODUODENOSCOPY  2017    ESOPHAGOGASTRODUODENOSCOPY (EGD) N/A 4/30/2018    Performed by [facility] MD at [facility] ENDO (4TH FLR)    MANOMETRY-ESOPHAGEAL-WITH IMPEDANCE N/A 5/17/2018    Performed by [facility]ton MD at [facility] ENDO (4TH FLR)        Family History:   Family History   Problem Relation Age of Onset    Lung cancer Maternal Grandfather     Esophageal cancer Paternal Grandfather 50        smoker, drinker    Birth defects Paternal Grandfather 50        esophageal    Colon cancer Neg Hx     Crohn's disease Neg Hx     Inflammatory bowel disease Neg Hx     Irritable bowel syndrome Neg Hx     Liver cancer Neg Hx     Rectal cancer Neg Hx     Stomach cancer Neg Hx     Celiac disease Neg Hx     Cystic fibrosis Neg Hx     Hemochromatosis Neg Hx     Ulcerative colitis Neg Hx     Liver disease Neg Hx     Christian's disease Neg Hx     Lymphoma Neg Hx     Tuberculosis Neg Hx     Rheum arthritis Neg Hx     Scleroderma Neg Hx     Melanoma Neg Hx     Multiple sclerosis Neg Hx     Lupus Neg Hx     Psoriasis Neg Hx     Skin cancer Neg Hx         Social History:   Social History     Socioeconomic History    Marital status:      Spouse name: Not on file    Number of children: Not on file    Years of education: Not on file    Highest education level: Not on file   Occupational History    Not on file   Social Needs    Financial resource strain: Not on file    Food insecurity:     Worry: Not on file     Inability: Not on file    Transportation needs:     Medical: Not on file     Non-medical: Not on file   Tobacco Use    Smoking status: Never Smoker    Smokeless tobacco: Never Used   Substance and Sexual Activity    Alcohol use: Yes     Comment: less than weekly    Drug use: No    Sexual activity: Not on file   Lifestyle    Physical activity:     Days per week: Not on file     Minutes per session: Not on file    Stress: Not on file   Relationships    Social connections:  "    Talks on phone: Not on file     Gets together: Not on file     Attends Islam service: Not on file     Active member of club or organization: Not on file     Attends meetings of clubs or organizations: Not on file     Relationship status: Not on file   Other Topics Concern    Not on file   Social History Narrative    Not on file        Review of patient's allergies indicates:  Allergies not on file    Current Outpatient Medications   Medication Sig Dispense Refill    amitriptyline (ELAVIL) 50 MG tablet Take 1 tablet (50 mg total) by mouth every evening. 30 tablet 6    ascorbic acid, vitamin C, (VITAMIN C) 500 MG tablet Take 500 mg by mouth once daily.      BYSTOLIC 5 mg Tab TK 1 T PO QD IN THE EMEKA  5    cyanocobalamin, vitamin B-12, 1,000 mcg/15 mL Liqd Take by mouth.      eletriptan (RELPAX) 40 MG tablet Take 40 mg by mouth as needed. may repeat in 2 hours if necessary      metoclopramide HCl (REGLAN) 10 MG tablet       ondansetron (ZOFRAN) 8 MG tablet Take 1 tablet (8 mg total) by mouth every 8 (eight) hours as needed for Nausea. 90 tablet 3    rizatriptan (MAXALT) 10 MG tablet Maxalt 10 mg tablet   Take 1 tablet as needed by oral route as needed for 30 days.      pantoprazole (PROTONIX) 40 MG tablet Take 1 tablet (40 mg total) by mouth 2 (two) times daily. 180 tablet 3     No current facility-administered medications for this visit.         Objective Findings:  Vital Signs:  /82   Pulse 94   Ht 5' 6" (1.676 m)   Wt 97.7 kg (215 lb 6.2 oz)   BMI 34.76 kg/m²   Body mass index is 34.76 kg/m².    Physical Exam:  General appearance: alert, cooperative, no distress  HENT: Normocephalic, atraumatic, neck symmetrical, no nasal discharge  Eyes: conjunctivae/corneas clear, PERRL, EOM's intact  Lungs: clear to auscultation bilaterally, no dullness to percussion bilaterally  Heart: regular rate and rhythm without rub; no displacement of the PMI  Abdomen: soft, non-tender; bowel sounds normoactive; " no organomegaly  Extremities: extremities symmetric; no clubbing, cyanosis, or edema  Integument: Skin color, texture, turgor normal; no rashes; hair distrubution normal  Neurologic: Alert and oriented X 3, normal strength, normal coordination and gait  Psychiatric: no pressured speech; normal affect; no evidence of impaired cognition    Labs:  Lab Results   Component Value Date    WBC 8.95 01/11/2019    HGB 15.1 01/11/2019    HCT 44.7 01/11/2019    MCV 92 01/11/2019     (H) 01/11/2019     No results found for: FERRITIN  Lab Results   Component Value Date     04/10/2018    K 3.8 04/10/2018     04/10/2018    CO2 25 04/10/2018    GLU 91 04/10/2018    BUN 10 04/10/2018    CREATININE 0.9 04/10/2018    CALCIUM 9.5 04/10/2018    PROT 7.2 04/10/2018    ALBUMIN 4.1 04/10/2018    BILITOT 0.6 04/10/2018    ALKPHOS 104 04/10/2018    AST 28 04/10/2018    ALT 30 04/10/2018     Lab Results   Component Value Date    TSH 2.313 04/10/2018     No results found for: SEDRATE  No results found for: CRP  No results found for: LABA1C, HGBA1C        Assessment and Plan:  Lou Lucio is a 34 y.o. female with a PMH of HTN referred to motility clinic for second opinion regarding the following problems:    GERD with pyrosis and regurgitation.  Hiatal hernia.  Belching. Ph impedence off PPIs with severe reflux.  No improvement with Stretta in 6/2017.  PH impedence with significant acid reflux on PPI BID, mostly upright.  Gastric pH 4.  Very mild improvement after Stretta, unable to wean off PPI.   Manometry w evidence of rumination syndrome.   Significant improvement with diet and diaphragmatic breathing and TCA   -Cont protonix 40 mg BID.  Will consider decreasing dose in the future   -Zantact 150mg prn   -Cont amitriptyline 50mg QHS      Rumination syndrome demonstrated on manometry.  Likely related to GERD and belching   Significant improvement with diaphragmatic breathing and TCA   -Told by psychology that can return prn    -Cont diaphragmatic breathing   -Cont amitriptyline 50mg QHS     Globus sensation that improves w eating.  Gargling sensation in cervical esophagus. No dysphagia. Significantly improved MBS negative   -Minimal symptoms on amitriptyline 50mg QHS and therapy   -Will consider decreasing done at next visit     Ringed esophagus on previous EGD (insuficint tissue) at [facility].  Elevated IgE and peripheral Eos.  Negative biopsies on PPI at [facility].  Denies dysphagia.  Normal repeat CBC w diff and IgE    Distal esophageal spasm on manometry at [facility].  No Ceiba Classification abnormality at [facility].  Denies dysphagia or chest pain.      Nausea. Rare vomiting. Normal GES.  Resolved w TCA  -On amitriptyline 50mg QHS  -Zofran 8 mg q8hr PRN    Abdominal pain. IBS diagnosed in 2012.  Significantly improved.   Some improvement with levsin PRN in the past  -On amitriptyline 50mg QHS    Gas and bloating. Distention.  Significantly improved.     Avoids artificial sugars and lactose    Diarrhea. Few episodes of severe diarrhea with nocturnal symptoms and incontinence.  No celiac.  Negative colonoscopy. Resolved   -On amitriptyline 50mg QHS  -Questran 1 g QID PRN.     Recurrent episodes of nausea, vomiting, abdominal pain and severe diarrhea lasting a day (3-4 times in the past 2 years).  No recent episodes   -Check LTS, lipase, US during episode    H/o salmonella and c diff in 2012. Treated with PO abx with complete resolution    Blood in stool.  Hemorrhoids on colonoscopy.  Now resolved.     Fatty liver. Fibrosis:F 0-1.  Previously seen by hepatology     Anxiety. Depression.  Stress.  Mostly related to GI symptoms. Works as an .  Improved w behavioral measures    -Told by Dr. Regina Dash that can return prn   -Continue to work on stress reduction, relaxation, yoga, diaphragmatic breathing       Migraines.   Well controlled with relpax PRN.  Followed by neurology     Elevated plt   -FU w PCP     Follow up in about 6 months (around  11/13/2019) for Dr. Overton .    1. Rumination syndrome of ingested food in adult    2. Globus pharyngeus    3. Gastroesophageal reflux disease, esophagitis presence not specified    4. Bloating    5. Abdominal pain, unspecified abdominal location    6. Diarrhea, unspecified type          Order summary:         Thank you so much for allowing me to participate in the care of Lou Holguineddie Overton MD

## 2019-07-26 ENCOUNTER — OFFICE VISIT (OUTPATIENT)
Dept: PSYCHIATRY | Facility: CLINIC | Age: 34
End: 2019-07-26
Payer: COMMERCIAL

## 2019-07-26 DIAGNOSIS — F41.9 ANXIETY: Primary | ICD-10-CM

## 2019-07-26 PROCEDURE — 90834 PSYTX W PT 45 MINUTES: CPT | Mod: S$GLB,,, | Performed by: PSYCHOLOGIST

## 2019-07-26 PROCEDURE — 90834 PR PSYCHOTHERAPY W/PATIENT, 45 MIN: ICD-10-PCS | Mod: S$GLB,,, | Performed by: PSYCHOLOGIST

## 2019-07-26 NOTE — PROGRESS NOTES
"Individual Psychotherapy (PhD/LCSW)    7/26/2019    Site:  [facility]         Therapeutic Intervention: Met with patient.  Outpatient - Insight oriented psychotherapy 45 min - CPT code 36105    Chief complaint/reason for encounter: anxiety     Interval history and content of current session: Pt reports that she continues to do well physically. However, she has been increasingly stressed and anxious for the past month at work. She describes her boss as being very difficult to deal with, and being notorious around the office for being verbally abusive to staff members. She has been able to handle him for years, and believes that he respects her more than he does anyone else in the office, but lately he has been particularly mean to her (I.e. Throwing papers in her face, calling her "stupid") and this has been leading to more anxiety and anger at work. Pt recognizes that this is not due to her own problems, but recognizes that she needs things to change because she is being affected by it. We discussed different options for pt in terms of how to handle it, given the fact that pt wants to stay in the job for now. We discussed switching communication tactics with him when she is trying to get him to change a behavior, rather than always taking a combative and sarcastic tone with him and using more "I statements" with him rather than statements that put him on the defensive immediately. Pt has concerns about this, which we explored.     Treatment plan:  · Target symptoms: anxiety   · Why chosen therapy is appropriate versus another modality: relevant to diagnosis, patient responds to this modality  · Outcome monitoring methods: self-report, observation  · Therapeutic intervention type: insight oriented psychotherapy    Risk parameters:  Patient reports no suicidal ideation  Patient reports no homicidal ideation  Patient reports no self-injurious behavior  Patient reports no violent behavior    Verbal deficits: " None    Patient's response to intervention:  The patient's response to intervention is accepting.    Progress toward goals and other mental status changes:  The patient's progress toward goals is fair .    Diagnosis:   Anxiety Disorder NOS    Plan:  individual psychotherapy     Return to clinic: 1 month    Length of Service (minutes): 45

## 2019-08-23 ENCOUNTER — OFFICE VISIT (OUTPATIENT)
Dept: PSYCHIATRY | Facility: CLINIC | Age: 34
End: 2019-08-23
Payer: COMMERCIAL

## 2019-08-23 DIAGNOSIS — F41.9 ANXIETY: Primary | ICD-10-CM

## 2019-08-23 PROCEDURE — 90834 PR PSYCHOTHERAPY W/PATIENT, 45 MIN: ICD-10-PCS | Mod: S$GLB,,, | Performed by: PSYCHOLOGIST

## 2019-08-23 PROCEDURE — 90834 PSYTX W PT 45 MINUTES: CPT | Mod: S$GLB,,, | Performed by: PSYCHOLOGIST

## 2019-08-23 NOTE — PROGRESS NOTES
"Individual Psychotherapy (PhD/LCSW)    8/23/2019    Site:  [facility]         Therapeutic Intervention: Met with patient.  Outpatient - Insight oriented psychotherapy 45 min - CPT code 16187    Chief complaint/reason for encounter: anxiety     Interval history and content of current session: Pt reports improvements in her stress level from last month. She states that her boss has "calmed down" some and pt has found some helpful strategies for dealing with him when he starts to get loud. She also got a new associate in her office whom she feels is competent and being able to delegate work to him has worked well. As pt has done this, she has had more time to leave work early and have more balance in her life. Pt has recognized some sadness as she does not have any local friends without children, so no one is willing to spontaneously get a drink with her or spend time when she wants to. We explored how even though pt's desire to not have children is perfectly "normal" in other locations, it is outside the norm of her town and friends, which makes her feel like she is "weird" and increases a sense of loneliness at times. We brainstormed on how pt might meet others that have similar values and interests as her.    Treatment plan:  · Target symptoms: anxiety   · Why chosen therapy is appropriate versus another modality: relevant to diagnosis, patient responds to this modality  · Outcome monitoring methods: self-report, observation  · Therapeutic intervention type: insight oriented psychotherapy    Risk parameters:  Patient reports no suicidal ideation  Patient reports no homicidal ideation  Patient reports no self-injurious behavior  Patient reports no violent behavior    Verbal deficits: None    Patient's response to intervention:  The patient's response to intervention is accepting.    Progress toward goals and other mental status changes:  The patient's progress toward goals is fair .    Diagnosis:   Anxiety " Disorder NOS    Plan:  individual psychotherapy     Return to clinic: 1 month    Length of Service (minutes): 45

## 2019-09-05 ENCOUNTER — PATIENT MESSAGE (OUTPATIENT)
Dept: GASTROENTEROLOGY | Facility: CLINIC | Age: 34
End: 2019-09-05

## 2019-09-05 RX ORDER — AMITRIPTYLINE HYDROCHLORIDE 50 MG/1
TABLET, FILM COATED ORAL
Qty: 30 TABLET | Refills: 0 | Status: SHIPPED | OUTPATIENT
Start: 2019-09-05 | End: 2019-10-14 | Stop reason: SDUPTHER

## 2019-09-05 RX ORDER — PANTOPRAZOLE SODIUM 40 MG/1
TABLET, DELAYED RELEASE ORAL
Qty: 180 TABLET | Refills: 0 | Status: SHIPPED | OUTPATIENT
Start: 2019-09-05 | End: 2020-12-08

## 2019-09-19 ENCOUNTER — TELEPHONE (OUTPATIENT)
Dept: GASTROENTEROLOGY | Facility: CLINIC | Age: 34
End: 2019-09-19

## 2019-09-27 ENCOUNTER — OFFICE VISIT (OUTPATIENT)
Dept: PSYCHIATRY | Facility: CLINIC | Age: 34
End: 2019-09-27
Payer: COMMERCIAL

## 2019-09-27 DIAGNOSIS — F41.9 ANXIETY: Primary | ICD-10-CM

## 2019-09-27 PROCEDURE — 90834 PR PSYCHOTHERAPY W/PATIENT, 45 MIN: ICD-10-PCS | Mod: S$GLB,,, | Performed by: PSYCHOLOGIST

## 2019-09-27 PROCEDURE — 90834 PSYTX W PT 45 MINUTES: CPT | Mod: S$GLB,,, | Performed by: PSYCHOLOGIST

## 2019-09-27 NOTE — PROGRESS NOTES
"Individual Psychotherapy (PhD/LCSW)    9/27/2019    Site:  [facility]         Therapeutic Intervention: Met with patient.  Outpatient - Insight oriented psychotherapy 45 min - CPT code 40183    Chief complaint/reason for encounter: anxiety     Interval history and content of current session: Pt describes increased irritability and frustration over the past 2 weeks. She describes some problems she is having in her job - particularly her boss once again being highly critical of her, giving her way more work than any of the other associates, taking credit away from her when she is successful with something, and accusing her of "not understanding" things that she is clearly an expert at in her field. She states that she does not buy into the content of his "rants" about her, but nonetheless getting yelled at for good work as well as the other mentioned behaviors feels demoralizing and is increasing her anger and anxiety. She has noticed ways that these emotions are bleeding into other relationships that she has, including her getting frustrated with her  during board game night ("Sometimes he doesn't say what he means or misunderstands me - I know it's not intentional but it pisses me off"), or with her good friend who just started a new job and is asking her frequently for job advice and tips. Worked with pt to help her understand why she is getting more frustrated in these situations, and also strategies to manage her frustration better, including emotional regulation strategies in some of the situations (particularly with board game night) and setting better boundaries with other situations.     Treatment plan:  · Target symptoms: anxiety   · Why chosen therapy is appropriate versus another modality: relevant to diagnosis, patient responds to this modality  · Outcome monitoring methods: self-report, observation  · Therapeutic intervention type: insight oriented psychotherapy    Risk parameters:  Patient " reports no suicidal ideation  Patient reports no homicidal ideation  Patient reports no self-injurious behavior  Patient reports no violent behavior    Verbal deficits: None    Patient's response to intervention:  The patient's response to intervention is accepting.    Progress toward goals and other mental status changes:  The patient's progress toward goals is fair .    Diagnosis:   Anxiety Disorder NOS    Plan:  individual psychotherapy     Return to clinic: 1 month    Length of Service (minutes): 45

## 2019-10-15 RX ORDER — AMITRIPTYLINE HYDROCHLORIDE 50 MG/1
TABLET, FILM COATED ORAL
Qty: 30 TABLET | Refills: 6 | Status: SHIPPED | OUTPATIENT
Start: 2019-10-15 | End: 2020-06-08

## 2019-10-24 ENCOUNTER — TELEPHONE (OUTPATIENT)
Dept: GASTROENTEROLOGY | Facility: CLINIC | Age: 34
End: 2019-10-24

## 2019-10-25 ENCOUNTER — OFFICE VISIT (OUTPATIENT)
Dept: PSYCHIATRY | Facility: CLINIC | Age: 34
End: 2019-10-25
Payer: COMMERCIAL

## 2019-10-25 DIAGNOSIS — F41.9 ANXIETY: Primary | ICD-10-CM

## 2019-10-25 PROCEDURE — 90834 PR PSYCHOTHERAPY W/PATIENT, 45 MIN: ICD-10-PCS | Mod: S$GLB,,, | Performed by: PSYCHOLOGIST

## 2019-10-25 PROCEDURE — 90834 PSYTX W PT 45 MINUTES: CPT | Mod: S$GLB,,, | Performed by: PSYCHOLOGIST

## 2019-10-25 NOTE — PROGRESS NOTES
"Individual Psychotherapy (PhD/LCSW)    10/25/2019    Site:  [facility]         Therapeutic Intervention: Met with patient.  Outpatient - Insight oriented psychotherapy 45 min - CPT code 87773    Chief complaint/reason for encounter: anxiety     Interval history and content of current session: Pt reports improvements in her work situation over the past two weeks. Her boss essentially admitted to her that he believes she has a "thick skin" and this is why he "doesn't have to be nice" to her, which validated pt's understanding of her work situation and his behavior. This led to a conversation about pt's conflicting needs to be seen as strong and competent (aka "not needing anyone") with actually having needs such as acknowledgment and support from others. She feels upset and disappointed that friends do not reciprocate her tokens and acknowledgements of their accomplishments, but was open to recognizing that she typically gives no indication that she needs or wants that, nor does she share any particular successes with others as they are par for the course for her. Noticed the bind that she puts herself in, and how what she could "do" about it is to open up some vulnerability with others which she is unwilling to do at this time. Pt also noted that her catastrophic and over-personalization in social situations often leads to high anxiety and depression. For instance, she immediately concludes when someone can't do something with her that they don't want to spend time with her or don't like her, as opposed to there being a more benign reason for it, and then pulls away from the person and confirms her suspicions when they do not pursue because they don't know that something is wrong. Pt believes this might stem from her mother's personality traits of believing others were "out to get her" all the time. Encouraged pt to notice these ineffective thinking styles and work on reframing them.    Treatment " plan:  · Target symptoms: anxiety   · Why chosen therapy is appropriate versus another modality: relevant to diagnosis, patient responds to this modality  · Outcome monitoring methods: self-report, observation  · Therapeutic intervention type: insight oriented psychotherapy    Risk parameters:  Patient reports no suicidal ideation  Patient reports no homicidal ideation  Patient reports no self-injurious behavior  Patient reports no violent behavior    Verbal deficits: None    Patient's response to intervention:  The patient's response to intervention is accepting.    Progress toward goals and other mental status changes:  The patient's progress toward goals is fair .    Diagnosis:   Anxiety Disorder NOS    Plan:  individual psychotherapy     Return to clinic: 1 month    Length of Service (minutes): 45

## 2019-12-23 ENCOUNTER — PATIENT MESSAGE (OUTPATIENT)
Dept: PSYCHIATRY | Facility: CLINIC | Age: 34
End: 2019-12-23

## 2020-01-17 ENCOUNTER — OFFICE VISIT (OUTPATIENT)
Dept: PSYCHIATRY | Facility: CLINIC | Age: 35
End: 2020-01-17
Payer: COMMERCIAL

## 2020-01-17 DIAGNOSIS — F41.9 ANXIETY: Primary | ICD-10-CM

## 2020-01-17 PROCEDURE — 90834 PR PSYCHOTHERAPY W/PATIENT, 45 MIN: ICD-10-PCS | Mod: S$GLB,,, | Performed by: PSYCHOLOGIST

## 2020-01-17 PROCEDURE — 90834 PSYTX W PT 45 MINUTES: CPT | Mod: S$GLB,,, | Performed by: PSYCHOLOGIST
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
## 2020-02-10 ENCOUNTER — OFFICE VISIT (OUTPATIENT)
Dept: PSYCHIATRY | Facility: CLINIC | Age: 35
End: 2020-02-10
Payer: COMMERCIAL

## 2020-02-10 DIAGNOSIS — F41.9 ANXIETY: Primary | ICD-10-CM

## 2020-02-10 PROCEDURE — 90834 PSYTX W PT 45 MINUTES: CPT | Mod: S$GLB,,, | Performed by: PSYCHOLOGIST

## 2020-02-10 PROCEDURE — 90834 PR PSYCHOTHERAPY W/PATIENT, 45 MIN: ICD-10-PCS | Mod: S$GLB,,, | Performed by: PSYCHOLOGIST

## 2020-02-10 NOTE — PROGRESS NOTES
Individual Psychotherapy (PhD/LCSW)    2/10/2020    Site:  [facility]         Therapeutic Intervention: Met with patient.  Outpatient - Insight oriented psychotherapy 45 min - CPT code 87698    Chief complaint/reason for encounter: anxiety     Interval history and content of current session: Pt going through two major life changes. She has found a new job and quit her current job since our last appointment, finally feeling that she no longer needed to continue working in that environment and having found a satisfying alternative. Her last day is Friday. She reports feeling great relief and pride since quitting. Used this as an opportunity to note how her perspective shift is what changed here, no external factors at all, and that this might be symbolic/applicable to other areas in her life as well. The other major change is that her grandmother has been placed in hospice as of last week. She is the only family member that pt has felt unconditionally loved by, who stays connected to and interested in, and who has shown her compassion and attention throughout her life consistently. Pt recognized that she is not only pre-grieving her grandmother but also the role she has played that she feels she is losing. Began working on processing her grief, and also orienting her to seeing this time she has with her as a gift that she can choose what to do with. Also framed her sadness, which she demonstrated throughout the session, as something important and powerful, not only something to dread and fear, as it keeps her connected to her love for her grandma.    Treatment plan:  · Target symptoms: anxiety   · Why chosen therapy is appropriate versus another modality: relevant to diagnosis, patient responds to this modality  · Outcome monitoring methods: self-report, observation  · Therapeutic intervention type: insight oriented psychotherapy    Risk parameters:  Patient reports no suicidal ideation  Patient reports no  homicidal ideation  Patient reports no self-injurious behavior  Patient reports no violent behavior    Verbal deficits: None    Patient's response to intervention:  The patient's response to intervention is accepting.    Progress toward goals and other mental status changes:  The patient's progress toward goals is fair .    Diagnosis:   Anxiety Disorder NOS    Plan:  individual psychotherapy     Return to clinic: 1 month - she will contact me to schedule session as she is unsure what her new work schedule will look like at this point.    Length of Service (minutes): 45

## 2020-06-08 ENCOUNTER — TELEPHONE (OUTPATIENT)
Dept: GASTROENTEROLOGY | Facility: HOSPITAL | Age: 35
End: 2020-06-08

## 2020-06-19 ENCOUNTER — OFFICE VISIT (OUTPATIENT)
Dept: GASTROENTEROLOGY | Facility: CLINIC | Age: 35
End: 2020-06-19

## 2020-06-19 DIAGNOSIS — K21.9 GASTROESOPHAGEAL REFLUX DISEASE, ESOPHAGITIS PRESENCE NOT SPECIFIED: Primary | ICD-10-CM

## 2020-06-19 DIAGNOSIS — R11.0 NAUSEA: ICD-10-CM

## 2020-06-19 DIAGNOSIS — R10.9 ABDOMINAL PAIN, UNSPECIFIED ABDOMINAL LOCATION: ICD-10-CM

## 2020-06-19 DIAGNOSIS — R11.10 RUMINATION SYNDROME OF INGESTED FOOD IN ADULT: ICD-10-CM

## 2020-06-19 PROCEDURE — 99214 PR OFFICE/OUTPT VISIT, EST, LEVL IV, 30-39 MIN: ICD-10-PCS | Mod: 95,,, | Performed by: INTERNAL MEDICINE

## 2020-06-19 PROCEDURE — 99214 OFFICE O/P EST MOD 30 MIN: CPT | Mod: 95,,, | Performed by: INTERNAL MEDICINE

## 2020-06-19 NOTE — PATIENT INSTRUCTIONS
-Decrease Protonix to 40mg daily.  You can take protonix 40mg at night as needed.   -Four weeks after reducing PPI dose, decrease amitriptyline to 25mg at night for a week, than stop taking amitriptyline.    -Follow up with Dr. Link for continued management of your GI problems and colon cancer screening   -Return to my motility clinic as needed   -If you continue to do well, you can try to eliminate Protonix in the future with Dr. Link

## 2020-06-19 NOTE — PROGRESS NOTES
The patient location is: home  The chief complaint leading to consultation is: rumination syndrome, gerd    Visit type: audiovisual    Face to Face time with patient: 17 min  27 minutes of total time spent on the encounter, which includes face to face time and non-face to face time preparing to see the patient (eg, review of tests), Obtaining and/or reviewing separately obtained history, Documenting clinical information in the electronic or other health record, Independently interpreting results (not separately reported) and communicating results to the patient/family/caregiver, or Care coordination (not separately reported).       Each patient to whom he or she provides medical services by telemedicine is:  (1) informed of the relationship between the physician and patient and the respective role of any other health care provider with respect to management of the patient; and (2) notified that he or she may decline to receive medical services by telemedicine and may withdraw from such care at any time.      [facility] Consultation Note    Reason for Consult:    No chief complaint on file.        PCP:   Primary Doctor No       Current GI: Dr. Link     Referring MD:  Abdelrahman Yates Md  [address], [address] [place] [id number]    HPI:  Lou Lucio is a 35 y.o. female with a PMH of HTN referred to motility clinic for second opinion regarding the following problems:    GERD.    Pyrosis doing well. Infrequent  Regurgitation.  Not common.   Gets sx when forgets ppi.   Pantoprazole 40mg BID  Takes famoditine prn   Amitriptyline 50mg QHS w significant improvement     Rumination.  Doing very well. Practices DB daily.   Occasional   Able to suppress her symptoms with diaphragmatic breathing        Globus sensation. Persisted to mild degree.   Decreased by 75% w amytriptyline  Now at 10% w reduced stress recently     Nausea. Rare.  Usually when overeats  No early satiety.   none  Onset: 2012  Vomiting.  none   Takes zofran prn - not needed anymore     Abdominal pain. None     Gas and bloating. Occasional    Diarrhea. Resolved   Schroeder 4  Daily     Blood in stool. Resolved.     Migraines. Takes relpax 40 mg PRN. Once to twice monthly. H/o Bell's palsy in 2013. treated with lyrica and steroids x few weeks. Seeing neurology since 2008.    Anxiety. Depression. Stress well controlled  Practices mindfulness and yoga  No longer follows w Dr. Dash since January      Denies dysphagia, constipation,  melena, weight loss, trouble sleeping    Total visit time was 37 minutes, more than 50% of which was spent in face-to-face counseling with patient regarding symptoms, diagnostic results, prognosis, risks and benefits of treatment options, instructions for management, importance of compliance with chosen treatment options, risk factor reduction, stress reduction, coping strategies.      Previous Studies:   MBS 2/14/2019: patient demonstrates oropharyngeal swallowing function appropriate for a full PO diet w/o significant restriction.  No aspiration observed.   Esophagogram 2/13/2019: Normal examination.  Barium pill passed through the esophagus and the GE junction smoothly.  Esophageal Manometry 5/17/18: No [place] Classification Abnormality.  Complete bolus clearance.  No Hiatal hernia.  Normal multiple rapid swallows test.  No signioficant changes with provocative maneuvers  Aerophagia. Rumination Syndrome.    PH impedence 5/17/18: Significant acid reflux on PPI BID based on pH data.  DeMeester Score (<14.7): 46. Total percent time pH less than 4 (< 4.5; <1 if PPI BID): 13.4.  Weakly acidic reflux on ppi.  No non-acidic reflux.  Good correlation between heartburn and weakly acid episodes.  Good correlation between regurgitation and acidic and weakly acidic reflux episodes. Results may be affected by presence of rumination syndrome documented on esophageal manometry.    GES 5/4/18: NL. 4%  retention at 4 hrs  EGD 4/30/18:NL esophagus (scattered intraepithelial lymphocytes and reactive change). Gastric erythema (reactive gastropathy). NL duodenum (-).  Gastric pH 4.    Colon 4/30/18:GPTTI. Sigmoid tics. NL colon (-). Rectal erythema (-). Non bleeding int hemorrhoid. NL TI (-). Rpt at 50 yrs old for screening.   CT abd/pelivs 4/14/18: ovarian cysts. Fatty liver. Appendicoliths.   EGD 6/9/17: NL esophagus. STRETTA. Small HH. NL stomach. NL duodenum.   EGD 2/17/17:ringed esophagus and feline appearence in middle third esophagus (no tissue). Diffuse mild gastric erythema (-). Nl duodenum.  Esophageal manometry 3/9/17: distal esophageal spasm, no EGJ outflow obstruction.  IRP NL 10.9; DCI NL 1840.2; DL -2.5?; 8% failed swallows, 8% pan esophageal pressurization, 58% premature contractions, no HH.  Personally reviewed by Dr. Overton   PH impedence off meds 3/9/17:  Elevated DeMester score (47.8) indicates severe reflux. 13.5% time in reflux: 24 % upright, 3.3 % supine, 13.5% postprandial.  SI/SAP: heartburn 100.0/99.9; CP 96.2/100.0; regur 96.4/100.0; belch 100.0/98.6;  100% proximal esophageal reflux.  Personally reviewed by Dr. Overton   *EGD/colon 2012:pt reports c.diff. normal procedures.    *per referring provider note/pt report    LABS:  pEos H  IgE H  TTg/IgA-  TSH nl     ROS:  Review of Systems   Constitutional: Negative for chills, fever and weight loss.   Eyes: Negative for pain and redness.   Respiratory: Negative for cough and shortness of breath.    Cardiovascular: Negative for chest pain.   Gastrointestinal: Positive for heartburn. Negative for abdominal pain, nausea and vomiting.   Genitourinary: Negative for dysuria and hematuria.   Musculoskeletal: Negative for back pain.   Skin: Negative for rash.   Psychiatric/Behavioral: Negative for depression. The patient is nervous/anxious.       Medical History:   Past Medical History:   Diagnosis Date    C. difficile colitis     GERD (gastroesophageal  reflux disease)     Hypertension     Irritable bowel syndrome     Migraines         Surgical History:   Past Surgical History:   Procedure Laterality Date    APPENDECTOMY      COLONOSCOPY  2012    COLONOSCOPY N/A 4/30/2018    Procedure: Colonoscopy;  Surgeon: Sirisha Overton MD;  Location: [address];  Service: Endoscopy;  Laterality: N/A;  PM prep    ESOPHAGOGASTRODUODENOSCOPY  2017        Family History:   Family History   Problem Relation Age of Onset    Lung cancer Maternal Grandfather     Esophageal cancer Paternal Grandfather 50        smoker, drinker    Birth defects Paternal Grandfather 50        esophageal    Colon cancer Neg Hx     Crohn's disease Neg Hx     Inflammatory bowel disease Neg Hx     Irritable bowel syndrome Neg Hx     Liver cancer Neg Hx     Rectal cancer Neg Hx     Stomach cancer Neg Hx     Celiac disease Neg Hx     Cystic fibrosis Neg Hx     Hemochromatosis Neg Hx     Ulcerative colitis Neg Hx     Liver disease Neg Hx     Christian's disease Neg Hx     Lymphoma Neg Hx     Tuberculosis Neg Hx     Rheum arthritis Neg Hx     Scleroderma Neg Hx     Melanoma Neg Hx     Multiple sclerosis Neg Hx     Lupus Neg Hx     Psoriasis Neg Hx     Skin cancer Neg Hx         Social History:   Social History     Socioeconomic History    Marital status:      Spouse name: Not on file    Number of children: Not on file    Years of education: Not on file    Highest education level: Not on file   Occupational History    Not on file   Social Needs    Financial resource strain: Not on file    Food insecurity     Worry: Not on file     Inability: Not on file    Transportation needs     Medical: Not on file     Non-medical: Not on file   Tobacco Use    Smoking status: Never Smoker    Smokeless tobacco: Never Used   Substance and Sexual Activity    Alcohol use: Yes     Comment: less than weekly    Drug use: No    Sexual activity: Not on file   Lifestyle     Physical activity     Days per week: Not on file     Minutes per session: Not on file    Stress: Not on file   Relationships    Social connections     Talks on phone: Not on file     Gets together: Not on file     Attends Sikh service: Not on file     Active member of club or organization: Not on file     Attends meetings of clubs or organizations: Not on file     Relationship status: Not on file   Other Topics Concern    Not on file   Social History Narrative    Not on file        Review of patient's allergies indicates:  Allergies not on file    Current Outpatient Medications   Medication Sig Dispense Refill    amitriptyline (ELAVIL) 50 MG tablet TAKE 1 TABLET(50 MG) BY MOUTH EVERY EVENING 30 tablet 2    ascorbic acid, vitamin C, (VITAMIN C) 500 MG tablet Take 500 mg by mouth once daily.      BYSTOLIC 5 mg Tab TK 1 T PO QD IN THE EMEKA  5    cyanocobalamin, vitamin B-12, 1,000 mcg/15 mL Liqd Take by mouth.      eletriptan (RELPAX) 40 MG tablet Take 40 mg by mouth as needed. may repeat in 2 hours if necessary      metoclopramide HCl (REGLAN) 10 MG tablet       ondansetron (ZOFRAN) 8 MG tablet Take 1 tablet (8 mg total) by mouth every 8 (eight) hours as needed for Nausea. 90 tablet 3    pantoprazole (PROTONIX) 40 MG tablet TAKE 1 TABLET(40 MG) BY MOUTH TWICE DAILY 180 tablet 0    rizatriptan (MAXALT) 10 MG tablet Maxalt 10 mg tablet   Take 1 tablet as needed by oral route as needed for 30 days.       No current facility-administered medications for this visit.         Objective Findings:  Vital Signs:  There were no vitals taken for this visit.  There is no height or weight on file to calculate BMI.    Physical Exam: telehealth    Labs:  Lab Results   Component Value Date    WBC 8.95 01/11/2019    HGB 15.1 01/11/2019    HCT 44.7 01/11/2019    MCV 92 01/11/2019     (H) 01/11/2019     No results found for: FERRITIN  Lab Results   Component Value Date     04/10/2018    K 3.8 04/10/2018      04/10/2018    CO2 25 04/10/2018    GLU 91 04/10/2018    BUN 10 04/10/2018    CREATININE 0.9 04/10/2018    CALCIUM 9.5 04/10/2018    PROT 7.2 04/10/2018    ALBUMIN 4.1 04/10/2018    BILITOT 0.6 04/10/2018    ALKPHOS 104 04/10/2018    AST 28 04/10/2018    ALT 30 04/10/2018     Lab Results   Component Value Date    TSH 2.313 04/10/2018     No results found for: SEDRATE  No results found for: CRP  No results found for: LABA1C, HGBA1C        Assessment and Plan:  Lou Lucio is a 35 y.o. female with a PMH of HTN referred to motility clinic for second opinion regarding the following problems:    GERD with pyrosis and regurgitation.  Hiatal hernia.  Belching. Ph impedence off PPIs with severe reflux.  No improvement with Stretta in 6/2017.  PH impedence with significant acid reflux on PPI BID, mostly upright.  Gastric pH 4.  Very mild improvement after Stretta, unable to wean off PPI.   Manometry w evidence of rumination syndrome which may be contributing to positive Ph testing  Significant improvement with diet and diaphragmatic breathing and TCA   -Taper protonix to 40 mg daily, 40 mg .  Will consider decreasing dose in the future   -Famotidine prn   -Taper amitriptyline to off     Rumination syndrome demonstrated on manometry.  Likely related to GERD and belching   Significant improvement with diaphragmatic breathing and TCA   -Cont diaphragmatic breathing     Globus sensation that improves w eating.  Gargling sensation in cervical esophagus. No dysphagia. Significantly improved MBS negative   -Taper amitriptyline to off    Ringed esophagus on previous EGD (insuficint tissue) at [facility].  Elevated IgE and peripheral Eos.  Negative biopsies on PPI at [facility].  Denies dysphagia.  Normal repeat CBC w diff and IgE    Distal esophageal spasm on manometry at OSH.  No [place] Classification abnormality at [facility].  Denies dysphagia or chest pain.      Nausea. Rare vomiting. Normal GES.  Resolved w TCA  -Taper amitriptyline to  off  -Zofran 8 mg q8hr PRN    Abdominal pain. IBS diagnosed in 2012.  Significantly improved.   Some improvement with levsin PRN in the past  -Taper amitriptyline to off    Gas and bloating. Distention.  Significantly improved.     Avoids artificial sugars and lactose    Diarrhea. Few episodes of severe diarrhea with nocturnal symptoms and incontinence.  No celiac.  Negative colonoscopy. Resolved   -Taper amitriptyline to off  -Questran 1 g QID PRN.     Recurrent episodes of nausea, vomiting, abdominal pain and severe diarrhea lasting a day (3-4 times in the past 2 years).  No recent episodes   -Check LTS, lipase, US during episode    H/o salmonella and c diff in 2012. Treated with PO abx with complete resolution    Blood in stool.  Hemorrhoids on colonoscopy.  Now resolved.     Fatty liver. Fibrosis:F 0-1.  Previously seen by hepatology     Anxiety. Depression.  Stress.  Mostly related to GI symptoms. Works as an .  Improved w behavioral measures    -Told by Dr. Regina Dash that can return prn   -Continue to work on stress reduction, relaxation, yoga, diaphragmatic breathing   -Taper amitriptyline to off      Migraines.   Well controlled with relpax PRN.  Followed by neurology     Follow up with Dr. Link  for management of chronic GI problems.  Return to motility clinic as needed. Plan discussed with pt, pt agrees.      Follow up if symptoms worsen or fail to improve.    1. Gastroesophageal reflux disease, esophagitis presence not specified    2. Rumination syndrome of ingested food in adult    3. Nausea    4. Abdominal pain, unspecified abdominal location          Order summary:         Thank you so much for allowing me to participate in the care of Lou Overton MD

## 2020-07-27 ENCOUNTER — PATIENT MESSAGE (OUTPATIENT)
Dept: GASTROENTEROLOGY | Facility: CLINIC | Age: 35
End: 2020-07-27

## 2020-12-02 ENCOUNTER — PATIENT MESSAGE (OUTPATIENT)
Dept: GASTROENTEROLOGY | Facility: CLINIC | Age: 35
End: 2020-12-02

## 2020-12-08 ENCOUNTER — PATIENT MESSAGE (OUTPATIENT)
Dept: GASTROENTEROLOGY | Facility: CLINIC | Age: 35
End: 2020-12-08

## 2020-12-08 DIAGNOSIS — K21.9 GASTROESOPHAGEAL REFLUX DISEASE, UNSPECIFIED WHETHER ESOPHAGITIS PRESENT: Primary | ICD-10-CM

## 2020-12-08 RX ORDER — PANTOPRAZOLE SODIUM 40 MG/1
40 TABLET, DELAYED RELEASE ORAL DAILY
Qty: 180 TABLET | Refills: 3 | Status: SHIPPED | OUTPATIENT
Start: 2020-12-08 | End: 2021-03-09

## 2020-12-08 NOTE — TELEPHONE ENCOUNTER
pls advise. Should she be getting a referral from  or can I route to [place] to arrange on Dr. Johnston's schedule?

## 2020-12-28 ENCOUNTER — TELEPHONE (OUTPATIENT)
Dept: ENDOSCOPY | Facility: HOSPITAL | Age: 35
End: 2020-12-28

## 2020-12-29 ENCOUNTER — PATIENT MESSAGE (OUTPATIENT)
Dept: ENDOSCOPY | Facility: HOSPITAL | Age: 35
End: 2020-12-29

## 2021-01-11 ENCOUNTER — TELEPHONE (OUTPATIENT)
Dept: ENDOSCOPY | Facility: HOSPITAL | Age: 36
End: 2021-01-11

## 2021-03-09 ENCOUNTER — OFFICE VISIT (OUTPATIENT)
Dept: GASTROENTEROLOGY | Facility: CLINIC | Age: 36
End: 2021-03-09
Payer: COMMERCIAL

## 2021-03-09 VITALS
WEIGHT: 222.69 LBS | SYSTOLIC BLOOD PRESSURE: 110 MMHG | HEART RATE: 95 BPM | HEIGHT: 66 IN | BODY MASS INDEX: 35.79 KG/M2 | DIASTOLIC BLOOD PRESSURE: 80 MMHG

## 2021-03-09 DIAGNOSIS — K21.9 GASTROESOPHAGEAL REFLUX DISEASE WITHOUT ESOPHAGITIS: Primary | ICD-10-CM

## 2021-03-09 DIAGNOSIS — R11.0 NAUSEA: ICD-10-CM

## 2021-03-09 PROCEDURE — 99999 PR PBB SHADOW E&M-EST. PATIENT-LVL III: CPT | Mod: PBBFAC,,, | Performed by: INTERNAL MEDICINE

## 2021-03-09 PROCEDURE — 99999 PR PBB SHADOW E&M-EST. PATIENT-LVL III: ICD-10-PCS | Mod: PBBFAC,,, | Performed by: INTERNAL MEDICINE

## 2021-03-09 PROCEDURE — 99213 OFFICE O/P EST LOW 20 MIN: CPT | Mod: S$GLB,,, | Performed by: INTERNAL MEDICINE

## 2021-03-09 PROCEDURE — 99213 PR OFFICE/OUTPT VISIT, EST, LEVL III, 20-29 MIN: ICD-10-PCS | Mod: S$GLB,,, | Performed by: INTERNAL MEDICINE

## 2021-03-09 RX ORDER — PANTOPRAZOLE SODIUM 40 MG/1
40 TABLET, DELAYED RELEASE ORAL 2 TIMES DAILY
Qty: 60 TABLET | Refills: 11 | Status: SHIPPED | OUTPATIENT
Start: 2021-03-09 | End: 2022-03-04 | Stop reason: SDUPTHER

## 2021-03-09 RX ORDER — ONDANSETRON 4 MG/1
4 TABLET, ORALLY DISINTEGRATING ORAL EVERY 6 HOURS PRN
Qty: 30 TABLET | Refills: 3 | Status: SHIPPED | OUTPATIENT
Start: 2021-03-09 | End: 2022-07-19

## 2021-04-05 ENCOUNTER — PATIENT MESSAGE (OUTPATIENT)
Dept: PSYCHIATRY | Facility: CLINIC | Age: 36
End: 2021-04-05

## 2021-04-14 ENCOUNTER — OFFICE VISIT (OUTPATIENT)
Dept: PSYCHIATRY | Facility: CLINIC | Age: 36
End: 2021-04-14
Payer: COMMERCIAL

## 2021-04-14 DIAGNOSIS — F41.9 ANXIETY: Primary | ICD-10-CM

## 2021-04-14 PROCEDURE — 90834 PR PSYCHOTHERAPY W/PATIENT, 45 MIN: ICD-10-PCS | Mod: S$GLB,,, | Performed by: PSYCHOLOGIST

## 2021-04-14 PROCEDURE — 90834 PSYTX W PT 45 MINUTES: CPT | Mod: S$GLB,,, | Performed by: PSYCHOLOGIST

## 2021-04-16 ENCOUNTER — PATIENT MESSAGE (OUTPATIENT)
Dept: RESEARCH | Facility: HOSPITAL | Age: 36
End: 2021-04-16

## 2021-04-27 ENCOUNTER — OFFICE VISIT (OUTPATIENT)
Dept: PSYCHIATRY | Facility: CLINIC | Age: 36
End: 2021-04-27
Payer: COMMERCIAL

## 2021-04-27 DIAGNOSIS — F41.9 ANXIETY: Primary | ICD-10-CM

## 2021-04-27 PROCEDURE — 90834 PSYTX W PT 45 MINUTES: CPT | Mod: S$GLB,,, | Performed by: PSYCHOLOGIST

## 2021-04-27 PROCEDURE — 90834 PR PSYCHOTHERAPY W/PATIENT, 45 MIN: ICD-10-PCS | Mod: S$GLB,,, | Performed by: PSYCHOLOGIST

## 2021-05-12 ENCOUNTER — OFFICE VISIT (OUTPATIENT)
Dept: PSYCHIATRY | Facility: CLINIC | Age: 36
End: 2021-05-12
Payer: COMMERCIAL

## 2021-05-12 DIAGNOSIS — F41.9 ANXIETY: Primary | ICD-10-CM

## 2021-05-12 PROCEDURE — 90834 PR PSYCHOTHERAPY W/PATIENT, 45 MIN: ICD-10-PCS | Mod: S$GLB,,, | Performed by: PSYCHOLOGIST

## 2021-05-12 PROCEDURE — 90834 PSYTX W PT 45 MINUTES: CPT | Mod: S$GLB,,, | Performed by: PSYCHOLOGIST

## 2021-05-27 ENCOUNTER — OFFICE VISIT (OUTPATIENT)
Dept: PSYCHIATRY | Facility: CLINIC | Age: 36
End: 2021-05-27
Payer: COMMERCIAL

## 2021-05-27 DIAGNOSIS — F41.9 ANXIETY: Primary | ICD-10-CM

## 2021-05-27 PROCEDURE — 90834 PR PSYCHOTHERAPY W/PATIENT, 45 MIN: ICD-10-PCS | Mod: S$GLB,,, | Performed by: PSYCHOLOGIST

## 2021-05-27 PROCEDURE — 90834 PSYTX W PT 45 MINUTES: CPT | Mod: S$GLB,,, | Performed by: PSYCHOLOGIST

## 2021-06-09 ENCOUNTER — OFFICE VISIT (OUTPATIENT)
Dept: PSYCHIATRY | Facility: CLINIC | Age: 36
End: 2021-06-09
Payer: COMMERCIAL

## 2021-06-09 DIAGNOSIS — F41.9 ANXIETY: Primary | ICD-10-CM

## 2021-06-09 PROCEDURE — 90834 PSYTX W PT 45 MINUTES: CPT | Mod: S$GLB,,, | Performed by: PSYCHOLOGIST

## 2021-06-09 PROCEDURE — 90834 PR PSYCHOTHERAPY W/PATIENT, 45 MIN: ICD-10-PCS | Mod: S$GLB,,, | Performed by: PSYCHOLOGIST

## 2021-06-23 ENCOUNTER — OFFICE VISIT (OUTPATIENT)
Dept: PSYCHIATRY | Facility: CLINIC | Age: 36
End: 2021-06-23
Payer: COMMERCIAL

## 2021-06-23 DIAGNOSIS — F41.9 ANXIETY: Primary | ICD-10-CM

## 2021-06-23 PROCEDURE — 90834 PSYTX W PT 45 MINUTES: CPT | Mod: S$GLB,,, | Performed by: PSYCHOLOGIST

## 2021-06-23 PROCEDURE — 90834 PR PSYCHOTHERAPY W/PATIENT, 45 MIN: ICD-10-PCS | Mod: S$GLB,,, | Performed by: PSYCHOLOGIST

## 2021-07-21 ENCOUNTER — OFFICE VISIT (OUTPATIENT)
Dept: PSYCHIATRY | Facility: CLINIC | Age: 36
End: 2021-07-21
Payer: COMMERCIAL

## 2021-07-21 DIAGNOSIS — F32.0 MDD (MAJOR DEPRESSIVE DISORDER), SINGLE EPISODE, MILD: ICD-10-CM

## 2021-07-21 DIAGNOSIS — F41.9 ANXIETY: Primary | ICD-10-CM

## 2021-07-21 PROCEDURE — 90834 PSYTX W PT 45 MINUTES: CPT | Mod: S$GLB,,, | Performed by: PSYCHOLOGIST

## 2021-07-21 PROCEDURE — 90834 PR PSYCHOTHERAPY W/PATIENT, 45 MIN: ICD-10-PCS | Mod: S$GLB,,, | Performed by: PSYCHOLOGIST

## 2021-08-04 ENCOUNTER — OFFICE VISIT (OUTPATIENT)
Dept: PSYCHIATRY | Facility: CLINIC | Age: 36
End: 2021-08-04
Payer: COMMERCIAL

## 2021-08-04 DIAGNOSIS — F33.1 MDD (MAJOR DEPRESSIVE DISORDER), RECURRENT EPISODE, MODERATE: Primary | ICD-10-CM

## 2021-08-04 DIAGNOSIS — F41.9 ANXIETY: ICD-10-CM

## 2021-08-04 PROCEDURE — 90834 PR PSYCHOTHERAPY W/PATIENT, 45 MIN: ICD-10-PCS | Mod: S$GLB,,, | Performed by: PSYCHOLOGIST

## 2021-08-04 PROCEDURE — 90834 PSYTX W PT 45 MINUTES: CPT | Mod: S$GLB,,, | Performed by: PSYCHOLOGIST

## 2021-08-19 ENCOUNTER — OFFICE VISIT (OUTPATIENT)
Dept: PSYCHIATRY | Facility: CLINIC | Age: 36
End: 2021-08-19
Payer: COMMERCIAL

## 2021-08-19 DIAGNOSIS — F33.1 MDD (MAJOR DEPRESSIVE DISORDER), RECURRENT EPISODE, MODERATE: Primary | ICD-10-CM

## 2021-08-19 DIAGNOSIS — F41.9 ANXIETY: ICD-10-CM

## 2021-08-19 PROCEDURE — 90834 PSYTX W PT 45 MINUTES: CPT | Mod: S$GLB,,, | Performed by: PSYCHOLOGIST

## 2021-08-19 PROCEDURE — 90834 PR PSYCHOTHERAPY W/PATIENT, 45 MIN: ICD-10-PCS | Mod: S$GLB,,, | Performed by: PSYCHOLOGIST

## 2021-08-31 ENCOUNTER — PATIENT MESSAGE (OUTPATIENT)
Dept: PSYCHIATRY | Facility: CLINIC | Age: 36
End: 2021-08-31

## 2021-09-10 ENCOUNTER — OFFICE VISIT (OUTPATIENT)
Dept: PSYCHIATRY | Facility: CLINIC | Age: 36
End: 2021-09-10
Payer: COMMERCIAL

## 2021-09-10 DIAGNOSIS — F41.9 ANXIETY: ICD-10-CM

## 2021-09-10 DIAGNOSIS — F33.1 MDD (MAJOR DEPRESSIVE DISORDER), RECURRENT EPISODE, MODERATE: Primary | ICD-10-CM

## 2021-09-10 PROCEDURE — 90834 PR PSYCHOTHERAPY W/PATIENT, 45 MIN: ICD-10-PCS | Mod: 95,,, | Performed by: PSYCHOLOGIST

## 2021-09-10 PROCEDURE — 90834 PSYTX W PT 45 MINUTES: CPT | Mod: 95,,, | Performed by: PSYCHOLOGIST

## 2021-09-22 ENCOUNTER — OFFICE VISIT (OUTPATIENT)
Dept: PSYCHIATRY | Facility: CLINIC | Age: 36
End: 2021-09-22
Payer: COMMERCIAL

## 2021-09-22 DIAGNOSIS — F33.1 MDD (MAJOR DEPRESSIVE DISORDER), RECURRENT EPISODE, MODERATE: Primary | ICD-10-CM

## 2021-09-22 DIAGNOSIS — F41.9 ANXIETY: ICD-10-CM

## 2021-09-22 PROCEDURE — 90834 PR PSYCHOTHERAPY W/PATIENT, 45 MIN: ICD-10-PCS | Mod: S$GLB,,, | Performed by: PSYCHOLOGIST

## 2021-09-22 PROCEDURE — 90834 PSYTX W PT 45 MINUTES: CPT | Mod: S$GLB,,, | Performed by: PSYCHOLOGIST

## 2021-10-06 ENCOUNTER — OFFICE VISIT (OUTPATIENT)
Dept: PSYCHIATRY | Facility: CLINIC | Age: 36
End: 2021-10-06
Payer: COMMERCIAL

## 2021-10-06 DIAGNOSIS — F41.9 ANXIETY: ICD-10-CM

## 2021-10-06 DIAGNOSIS — F32.0 MDD (MAJOR DEPRESSIVE DISORDER), SINGLE EPISODE, MILD: Primary | ICD-10-CM

## 2021-10-06 PROCEDURE — 90834 PR PSYCHOTHERAPY W/PATIENT, 45 MIN: ICD-10-PCS | Mod: S$GLB,,, | Performed by: PSYCHOLOGIST

## 2021-10-06 PROCEDURE — 90834 PSYTX W PT 45 MINUTES: CPT | Mod: S$GLB,,, | Performed by: PSYCHOLOGIST

## 2021-10-14 NOTE — TELEPHONE ENCOUNTER
Spoke w pt to clarify diagnosis and treatment plan.    74yo M with h/o CAD s/p MI w/stents 2007, CHF with 20% LVEF in 2016, AICD, HTN, HLD, DM, gout, CKD, COPD, PVD s/p aortobifemoral bypass, proximal right leg dvt (April 20th) on Eliquis presenting with SOB/AMS. GI consulted for increased liver enzymes     Increased Liver Enzymes   Favor 2/2 Congestive Hepatopathy d/t RHF   US Abd w/dilated IVC and hepatic vein; No cirrhosis noted   diurese per cardiology recs/appreciated   cont home lactulose daily   will follow     Abdominal Pain   pain improved   CT with RP Bleed  a/c and antiplt recs per vasc sx; input appreciated    CHF  severe LV dysfxn; has AICD  care per cardiology     I reviewed the overnight course of events on the unit, re-confirming the patient history. I discussed the care with the patient and their family. The plan of care was discussed with the physician assistant and modifications were made to the notation where appropriate. Differential diagnosis and plan of care discussed with patient after the evaluation. Advanced care planning was discussed with patient and family.  Advanced care planning forms were reviewed and discussed.  Risks, benefits and alternatives of gastroenterologic procedures were discussed in detail and all questions were answered. 35 minutes spent on total encounter of which more than fifty percent of the encounter was spent counseling and/or coordinating care by the attending physician.

## 2021-10-20 ENCOUNTER — OFFICE VISIT (OUTPATIENT)
Dept: PSYCHIATRY | Facility: CLINIC | Age: 36
End: 2021-10-20
Payer: COMMERCIAL

## 2021-10-20 DIAGNOSIS — F41.9 ANXIETY: ICD-10-CM

## 2021-10-20 DIAGNOSIS — F32.0 MDD (MAJOR DEPRESSIVE DISORDER), SINGLE EPISODE, MILD: Primary | ICD-10-CM

## 2021-10-20 PROCEDURE — 90834 PSYTX W PT 45 MINUTES: CPT | Mod: S$GLB,,, | Performed by: PSYCHOLOGIST

## 2021-10-20 PROCEDURE — 90834 PR PSYCHOTHERAPY W/PATIENT, 45 MIN: ICD-10-PCS | Mod: S$GLB,,, | Performed by: PSYCHOLOGIST

## 2021-11-03 ENCOUNTER — LAB VISIT (OUTPATIENT)
Dept: LAB | Facility: HOSPITAL | Age: 36
End: 2021-11-03
Payer: COMMERCIAL

## 2021-11-03 ENCOUNTER — OFFICE VISIT (OUTPATIENT)
Dept: PSYCHIATRY | Facility: CLINIC | Age: 36
End: 2021-11-03
Payer: COMMERCIAL

## 2021-11-03 ENCOUNTER — PATIENT MESSAGE (OUTPATIENT)
Dept: PSYCHIATRY | Facility: CLINIC | Age: 36
End: 2021-11-03
Payer: COMMERCIAL

## 2021-11-03 VITALS
WEIGHT: 217.5 LBS | HEART RATE: 78 BPM | SYSTOLIC BLOOD PRESSURE: 113 MMHG | HEIGHT: 66 IN | DIASTOLIC BLOOD PRESSURE: 82 MMHG | BODY MASS INDEX: 34.96 KG/M2

## 2021-11-03 DIAGNOSIS — F33.1 MDD (MAJOR DEPRESSIVE DISORDER), RECURRENT EPISODE, MODERATE: ICD-10-CM

## 2021-11-03 DIAGNOSIS — R53.83 FATIGUE, UNSPECIFIED TYPE: ICD-10-CM

## 2021-11-03 DIAGNOSIS — F33.1 MDD (MAJOR DEPRESSIVE DISORDER), RECURRENT EPISODE, MODERATE: Primary | ICD-10-CM

## 2021-11-03 DIAGNOSIS — M79.10 MYALGIA: ICD-10-CM

## 2021-11-03 DIAGNOSIS — F41.9 ANXIETY: Primary | ICD-10-CM

## 2021-11-03 DIAGNOSIS — F41.1 GAD (GENERALIZED ANXIETY DISORDER): ICD-10-CM

## 2021-11-03 DIAGNOSIS — F32.0 MDD (MAJOR DEPRESSIVE DISORDER), SINGLE EPISODE, MILD: ICD-10-CM

## 2021-11-03 LAB
ALBUMIN SERPL BCP-MCNC: 4.3 G/DL (ref 3.5–5.2)
ALP SERPL-CCNC: 106 U/L (ref 55–135)
ALT SERPL W/O P-5'-P-CCNC: 23 U/L (ref 10–44)
ANION GAP SERPL CALC-SCNC: 13 MMOL/L (ref 8–16)
AST SERPL-CCNC: 20 U/L (ref 10–40)
BASOPHILS # BLD AUTO: 0.06 K/UL (ref 0–0.2)
BASOPHILS NFR BLD: 0.4 % (ref 0–1.9)
BILIRUB SERPL-MCNC: 0.4 MG/DL (ref 0.1–1)
BUN SERPL-MCNC: 13 MG/DL (ref 6–20)
CALCIUM SERPL-MCNC: 10 MG/DL (ref 8.7–10.5)
CHLORIDE SERPL-SCNC: 105 MMOL/L (ref 95–110)
CO2 SERPL-SCNC: 22 MMOL/L (ref 23–29)
CREAT SERPL-MCNC: 0.9 MG/DL (ref 0.5–1.4)
DIFFERENTIAL METHOD: ABNORMAL
EOSINOPHIL # BLD AUTO: 0.6 K/UL (ref 0–0.5)
EOSINOPHIL NFR BLD: 4.4 % (ref 0–8)
ERYTHROCYTE [DISTWIDTH] IN BLOOD BY AUTOMATED COUNT: 12.6 % (ref 11.5–14.5)
EST. GFR  (AFRICAN AMERICAN): >60 ML/MIN/1.73 M^2
EST. GFR  (NON AFRICAN AMERICAN): >60 ML/MIN/1.73 M^2
FOLATE SERPL-MCNC: 5.2 NG/ML (ref 4–24)
GLUCOSE SERPL-MCNC: 75 MG/DL (ref 70–110)
HCT VFR BLD AUTO: 43.6 % (ref 37–48.5)
HGB BLD-MCNC: 14.4 G/DL (ref 12–16)
IMM GRANULOCYTES # BLD AUTO: 0.04 K/UL (ref 0–0.04)
IMM GRANULOCYTES NFR BLD AUTO: 0.3 % (ref 0–0.5)
LYMPHOCYTES # BLD AUTO: 3.2 K/UL (ref 1–4.8)
LYMPHOCYTES NFR BLD: 21.8 % (ref 18–48)
MCH RBC QN AUTO: 30.3 PG (ref 27–31)
MCHC RBC AUTO-ENTMCNC: 33 G/DL (ref 32–36)
MCV RBC AUTO: 92 FL (ref 82–98)
MONOCYTES # BLD AUTO: 0.8 K/UL (ref 0.3–1)
MONOCYTES NFR BLD: 5.4 % (ref 4–15)
NEUTROPHILS # BLD AUTO: 9.8 K/UL (ref 1.8–7.7)
NEUTROPHILS NFR BLD: 67.7 % (ref 38–73)
NRBC BLD-RTO: 0 /100 WBC
PLATELET # BLD AUTO: 451 K/UL (ref 150–450)
PMV BLD AUTO: 8.2 FL (ref 9.2–12.9)
POTASSIUM SERPL-SCNC: 4 MMOL/L (ref 3.5–5.1)
PROT SERPL-MCNC: 7.7 G/DL (ref 6–8.4)
RBC # BLD AUTO: 4.75 M/UL (ref 4–5.4)
SODIUM SERPL-SCNC: 140 MMOL/L (ref 136–145)
TSH SERPL DL<=0.005 MIU/L-ACNC: 2.15 UIU/ML (ref 0.4–4)
VIT B12 SERPL-MCNC: 388 PG/ML (ref 210–950)
WBC # BLD AUTO: 14.52 K/UL (ref 3.9–12.7)

## 2021-11-03 PROCEDURE — 90834 PR PSYCHOTHERAPY W/PATIENT, 45 MIN: ICD-10-PCS | Mod: S$GLB,,, | Performed by: PSYCHOLOGIST

## 2021-11-03 PROCEDURE — 84443 ASSAY THYROID STIM HORMONE: CPT | Performed by: NURSE PRACTITIONER

## 2021-11-03 PROCEDURE — 82746 ASSAY OF FOLIC ACID SERUM: CPT | Performed by: NURSE PRACTITIONER

## 2021-11-03 PROCEDURE — 36415 COLL VENOUS BLD VENIPUNCTURE: CPT | Performed by: NURSE PRACTITIONER

## 2021-11-03 PROCEDURE — 80053 COMPREHEN METABOLIC PANEL: CPT | Performed by: NURSE PRACTITIONER

## 2021-11-03 PROCEDURE — 82607 VITAMIN B-12: CPT | Performed by: NURSE PRACTITIONER

## 2021-11-03 PROCEDURE — 99999 PR PBB SHADOW E&M-EST. PATIENT-LVL III: CPT | Mod: PBBFAC,,, | Performed by: NURSE PRACTITIONER

## 2021-11-03 PROCEDURE — 90792 PSYCH DIAG EVAL W/MED SRVCS: CPT | Mod: S$GLB,,, | Performed by: NURSE PRACTITIONER

## 2021-11-03 PROCEDURE — 90792 PR PSYCHIATRIC DIAGNOSTIC EVALUATION W/MEDICAL SERVICES: ICD-10-PCS | Mod: S$GLB,,, | Performed by: NURSE PRACTITIONER

## 2021-11-03 PROCEDURE — 90834 PSYTX W PT 45 MINUTES: CPT | Mod: S$GLB,,, | Performed by: PSYCHOLOGIST

## 2021-11-03 PROCEDURE — 99999 PR PBB SHADOW E&M-EST. PATIENT-LVL III: ICD-10-PCS | Mod: PBBFAC,,, | Performed by: NURSE PRACTITIONER

## 2021-11-03 PROCEDURE — 85025 COMPLETE CBC W/AUTO DIFF WBC: CPT | Performed by: NURSE PRACTITIONER

## 2021-11-03 RX ORDER — VENLAFAXINE HYDROCHLORIDE 37.5 MG/1
37.5 CAPSULE, EXTENDED RELEASE ORAL DAILY
Qty: 30 CAPSULE | Refills: 1 | Status: SHIPPED | OUTPATIENT
Start: 2021-11-03 | End: 2021-11-09 | Stop reason: SINTOL

## 2021-11-03 RX ORDER — FEXOFENADINE HCL 60 MG
60 TABLET ORAL DAILY
COMMUNITY

## 2021-11-03 RX ORDER — SUMATRIPTAN SUCCINATE 100 MG/1
100 TABLET ORAL
COMMUNITY

## 2021-11-03 RX ORDER — LORATADINE 10 MG/1
10 TABLET ORAL DAILY
COMMUNITY

## 2021-11-03 RX ORDER — METHOCARBAMOL 500 MG/1
500 TABLET, FILM COATED ORAL ONCE
COMMUNITY
End: 2024-03-12

## 2021-11-03 RX ORDER — ERGOCALCIFEROL 1.25 MG/1
50000 CAPSULE ORAL
COMMUNITY

## 2021-11-03 RX ORDER — FLUTICASONE PROPIONATE 50 MCG
1 SPRAY, SUSPENSION (ML) NASAL DAILY
COMMUNITY
End: 2024-03-12 | Stop reason: DRUGHIGH

## 2021-11-03 RX ORDER — CETIRIZINE HYDROCHLORIDE 10 MG/1
10 TABLET ORAL DAILY
COMMUNITY

## 2021-11-05 ENCOUNTER — PATIENT MESSAGE (OUTPATIENT)
Dept: PSYCHIATRY | Facility: CLINIC | Age: 36
End: 2021-11-05
Payer: COMMERCIAL

## 2021-11-08 ENCOUNTER — PATIENT MESSAGE (OUTPATIENT)
Dept: PSYCHIATRY | Facility: CLINIC | Age: 36
End: 2021-11-08
Payer: COMMERCIAL

## 2021-11-09 RX ORDER — ESCITALOPRAM OXALATE 10 MG/1
10 TABLET ORAL DAILY
Qty: 30 TABLET | Refills: 1 | Status: SHIPPED | OUTPATIENT
Start: 2021-11-09 | End: 2024-03-12

## 2021-12-01 ENCOUNTER — OFFICE VISIT (OUTPATIENT)
Dept: PSYCHIATRY | Facility: CLINIC | Age: 36
End: 2021-12-01
Payer: COMMERCIAL

## 2021-12-01 DIAGNOSIS — F33.1 MDD (MAJOR DEPRESSIVE DISORDER), RECURRENT EPISODE, MODERATE: Primary | ICD-10-CM

## 2021-12-01 DIAGNOSIS — F41.1 GAD (GENERALIZED ANXIETY DISORDER): ICD-10-CM

## 2021-12-01 PROCEDURE — 90834 PSYTX W PT 45 MINUTES: CPT | Mod: S$GLB,,, | Performed by: PSYCHOLOGIST

## 2021-12-01 PROCEDURE — 90834 PR PSYCHOTHERAPY W/PATIENT, 45 MIN: ICD-10-PCS | Mod: S$GLB,,, | Performed by: PSYCHOLOGIST

## 2021-12-03 ENCOUNTER — PATIENT MESSAGE (OUTPATIENT)
Dept: PSYCHIATRY | Facility: CLINIC | Age: 36
End: 2021-12-03
Payer: COMMERCIAL

## 2021-12-08 ENCOUNTER — OFFICE VISIT (OUTPATIENT)
Dept: URGENT CARE | Facility: CLINIC | Age: 36
End: 2021-12-08
Payer: COMMERCIAL

## 2021-12-08 VITALS
TEMPERATURE: 98 F | DIASTOLIC BLOOD PRESSURE: 82 MMHG | HEIGHT: 66 IN | RESPIRATION RATE: 18 BRPM | HEART RATE: 81 BPM | BODY MASS INDEX: 34.55 KG/M2 | OXYGEN SATURATION: 98 % | WEIGHT: 215 LBS | SYSTOLIC BLOOD PRESSURE: 117 MMHG

## 2021-12-08 DIAGNOSIS — J30.9 ALLERGIC RHINITIS, UNSPECIFIED SEASONALITY, UNSPECIFIED TRIGGER: ICD-10-CM

## 2021-12-08 DIAGNOSIS — Z11.9 SCREENING EXAMINATION FOR UNSPECIFIED INFECTIOUS DISEASE: ICD-10-CM

## 2021-12-08 DIAGNOSIS — R05.9 COUGH: Primary | ICD-10-CM

## 2021-12-08 LAB
CTP QC/QA: YES
SARS-COV-2 RDRP RESP QL NAA+PROBE: NEGATIVE

## 2021-12-08 PROCEDURE — U0002 COVID-19 LAB TEST NON-CDC: HCPCS | Mod: QW,S$GLB,, | Performed by: NURSE PRACTITIONER

## 2021-12-08 PROCEDURE — U0002: ICD-10-PCS | Mod: QW,S$GLB,, | Performed by: NURSE PRACTITIONER

## 2021-12-08 PROCEDURE — 99204 PR OFFICE/OUTPT VISIT, NEW, LEVL IV, 45-59 MIN: ICD-10-PCS | Mod: S$GLB,,, | Performed by: NURSE PRACTITIONER

## 2021-12-08 PROCEDURE — 99204 OFFICE O/P NEW MOD 45 MIN: CPT | Mod: S$GLB,,, | Performed by: NURSE PRACTITIONER

## 2021-12-08 RX ORDER — BENZONATATE 100 MG/1
100 CAPSULE ORAL 3 TIMES DAILY PRN
Qty: 30 CAPSULE | Refills: 0 | Status: SHIPPED | OUTPATIENT
Start: 2021-12-08 | End: 2024-03-12

## 2021-12-08 RX ORDER — PROMETHAZINE HYDROCHLORIDE AND DEXTROMETHORPHAN HYDROBROMIDE 6.25; 15 MG/5ML; MG/5ML
5 SYRUP ORAL NIGHTLY PRN
Qty: 120 ML | Refills: 0 | Status: SHIPPED | OUTPATIENT
Start: 2021-12-08 | End: 2024-03-12

## 2021-12-08 RX ORDER — AZELASTINE 1 MG/ML
1 SPRAY, METERED NASAL 2 TIMES DAILY
Qty: 30 ML | Refills: 1 | Status: SHIPPED | OUTPATIENT
Start: 2021-12-08 | End: 2024-03-12

## 2021-12-09 ENCOUNTER — OFFICE VISIT (OUTPATIENT)
Dept: PSYCHIATRY | Facility: CLINIC | Age: 36
End: 2021-12-09
Payer: COMMERCIAL

## 2021-12-09 VITALS
DIASTOLIC BLOOD PRESSURE: 87 MMHG | HEART RATE: 87 BPM | WEIGHT: 214.94 LBS | BODY MASS INDEX: 34.69 KG/M2 | SYSTOLIC BLOOD PRESSURE: 136 MMHG

## 2021-12-09 DIAGNOSIS — F41.1 GAD (GENERALIZED ANXIETY DISORDER): ICD-10-CM

## 2021-12-09 DIAGNOSIS — F33.1 MDD (MAJOR DEPRESSIVE DISORDER), RECURRENT EPISODE, MODERATE: Primary | ICD-10-CM

## 2021-12-09 PROCEDURE — 99213 OFFICE O/P EST LOW 20 MIN: CPT | Mod: S$GLB,,, | Performed by: NURSE PRACTITIONER

## 2021-12-09 PROCEDURE — 90836 PSYTX W PT W E/M 45 MIN: CPT | Mod: S$GLB,,, | Performed by: NURSE PRACTITIONER

## 2021-12-09 PROCEDURE — 90836 PR PSYCHOTHERAPY W/PATIENT W/E&M, 45 MIN (ADD ON): ICD-10-PCS | Mod: S$GLB,,, | Performed by: NURSE PRACTITIONER

## 2021-12-09 PROCEDURE — 99213 PR OFFICE/OUTPT VISIT, EST, LEVL III, 20-29 MIN: ICD-10-PCS | Mod: S$GLB,,, | Performed by: NURSE PRACTITIONER

## 2021-12-09 PROCEDURE — 99999 PR PBB SHADOW E&M-EST. PATIENT-LVL III: CPT | Mod: PBBFAC,,, | Performed by: NURSE PRACTITIONER

## 2021-12-09 PROCEDURE — 99999 PR PBB SHADOW E&M-EST. PATIENT-LVL III: ICD-10-PCS | Mod: PBBFAC,,, | Performed by: NURSE PRACTITIONER

## 2021-12-15 ENCOUNTER — OFFICE VISIT (OUTPATIENT)
Dept: PSYCHIATRY | Facility: CLINIC | Age: 36
End: 2021-12-15
Payer: COMMERCIAL

## 2021-12-15 DIAGNOSIS — F41.1 GAD (GENERALIZED ANXIETY DISORDER): ICD-10-CM

## 2021-12-15 DIAGNOSIS — F32.0 MDD (MAJOR DEPRESSIVE DISORDER), SINGLE EPISODE, MILD: Primary | ICD-10-CM

## 2021-12-15 PROCEDURE — 90834 PSYTX W PT 45 MINUTES: CPT | Mod: S$GLB,,, | Performed by: PSYCHOLOGIST

## 2021-12-15 PROCEDURE — 90834 PR PSYCHOTHERAPY W/PATIENT, 45 MIN: ICD-10-PCS | Mod: S$GLB,,, | Performed by: PSYCHOLOGIST

## 2021-12-29 ENCOUNTER — PATIENT MESSAGE (OUTPATIENT)
Dept: PSYCHIATRY | Facility: CLINIC | Age: 36
End: 2021-12-29
Payer: COMMERCIAL

## 2022-01-10 ENCOUNTER — OFFICE VISIT (OUTPATIENT)
Dept: PSYCHIATRY | Facility: CLINIC | Age: 37
End: 2022-01-10
Payer: COMMERCIAL

## 2022-01-10 DIAGNOSIS — F41.1 GAD (GENERALIZED ANXIETY DISORDER): Primary | ICD-10-CM

## 2022-01-10 DIAGNOSIS — F32.4 DEPRESSION, MAJOR, SINGLE EPISODE, IN PARTIAL REMISSION: ICD-10-CM

## 2022-01-10 PROCEDURE — 90834 PSYTX W PT 45 MINUTES: CPT | Mod: S$GLB,,, | Performed by: PSYCHOLOGIST

## 2022-01-10 PROCEDURE — 90834 PR PSYCHOTHERAPY W/PATIENT, 45 MIN: ICD-10-PCS | Mod: S$GLB,,, | Performed by: PSYCHOLOGIST

## 2022-01-10 NOTE — PROGRESS NOTES
Individual Psychotherapy (PhD/LCSW)    1/10/2022    Site:  [facility]         Therapeutic Intervention: Met with patient.  Outpatient - Insight oriented psychotherapy 45 min - CPT code 74526    Chief complaint/reason for encounter: anxiety     Interval history and content of current session: Pt presents as euthymic today. She has been off of work for the past 1 month and shares that the time has been just as wonderful, relaxing, and rejuvenating as she had hoped. This was helped along by her  quickly getting a new job so she did not have to worry much about finances. She discussed her change in mindset about being more present, making sure to use free time to be creative in some way, creating a amina list every day to help her realize what makes her happy big and small, and making sure that she is being more balanced about the new job rather than fleeing into being the hardest worker and best liked employee immediately. She is considering making further career shifts in the future but is happy to spend her first quarter of the year acclimating to the new job, which starts next week.    Treatment plan:  · Target symptoms: anxiety   · Why chosen therapy is appropriate versus another modality: relevant to diagnosis, patient responds to this modality  · Outcome monitoring methods: self-report, observation  · Therapeutic intervention type: insight oriented psychotherapy    Risk parameters:  Patient reports no suicidal ideation  Patient reports no homicidal ideation  Patient reports no self-injurious behavior  Patient reports no violent behavior    Verbal deficits: None    Patient's response to intervention:  The patient's response to intervention is accepting.    Progress toward goals and other mental status changes:  The patient's progress toward goals is good.    Diagnosis:   Anxiety Disorder NOS  Major Depressive Disorder, in full remission.    Plan:  individual psychotherapy     Return to clinic: 4  weeks    Length of Service (minutes): 45

## 2022-02-23 ENCOUNTER — OFFICE VISIT (OUTPATIENT)
Dept: PSYCHIATRY | Facility: CLINIC | Age: 37
End: 2022-02-23
Payer: COMMERCIAL

## 2022-02-23 DIAGNOSIS — F41.9 ANXIETY: ICD-10-CM

## 2022-02-23 DIAGNOSIS — F32.4 DEPRESSION, MAJOR, SINGLE EPISODE, IN PARTIAL REMISSION: Primary | ICD-10-CM

## 2022-02-23 PROCEDURE — 90834 PR PSYCHOTHERAPY W/PATIENT, 45 MIN: ICD-10-PCS | Mod: S$GLB,,, | Performed by: PSYCHOLOGIST

## 2022-02-23 PROCEDURE — 90834 PSYTX W PT 45 MINUTES: CPT | Mod: S$GLB,,, | Performed by: PSYCHOLOGIST
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
## 2022-03-03 ENCOUNTER — PATIENT MESSAGE (OUTPATIENT)
Dept: GASTROENTEROLOGY | Facility: CLINIC | Age: 37
End: 2022-03-03
Payer: COMMERCIAL

## 2022-03-03 DIAGNOSIS — K21.9 GASTROESOPHAGEAL REFLUX DISEASE WITHOUT ESOPHAGITIS: ICD-10-CM

## 2022-03-04 ENCOUNTER — PATIENT MESSAGE (OUTPATIENT)
Dept: GASTROENTEROLOGY | Facility: CLINIC | Age: 37
End: 2022-03-04
Payer: COMMERCIAL

## 2022-03-04 RX ORDER — PANTOPRAZOLE SODIUM 40 MG/1
40 TABLET, DELAYED RELEASE ORAL 2 TIMES DAILY
Qty: 60 TABLET | Refills: 11 | Status: SHIPPED | OUTPATIENT
Start: 2022-03-04 | End: 2022-05-18 | Stop reason: SDUPTHER

## 2022-03-09 ENCOUNTER — PATIENT MESSAGE (OUTPATIENT)
Dept: PSYCHIATRY | Facility: CLINIC | Age: 37
End: 2022-03-09
Payer: COMMERCIAL

## 2022-04-20 ENCOUNTER — OFFICE VISIT (OUTPATIENT)
Dept: PSYCHIATRY | Facility: CLINIC | Age: 37
End: 2022-04-20
Payer: COMMERCIAL

## 2022-04-20 DIAGNOSIS — F41.9 ANXIETY: ICD-10-CM

## 2022-04-20 DIAGNOSIS — F33.42 MDD (MAJOR DEPRESSIVE DISORDER), RECURRENT, IN FULL REMISSION: Primary | ICD-10-CM

## 2022-04-20 PROCEDURE — 90834 PR PSYCHOTHERAPY W/PATIENT, 45 MIN: ICD-10-PCS | Mod: S$GLB,,, | Performed by: PSYCHOLOGIST

## 2022-04-20 PROCEDURE — 90834 PSYTX W PT 45 MINUTES: CPT | Mod: S$GLB,,, | Performed by: PSYCHOLOGIST

## 2022-04-20 NOTE — PROGRESS NOTES
Individual Psychotherapy (PhD/LCSW)    4/20/2022    Site:  [facility]         Therapeutic Intervention: Met with patient.  Outpatient - Insight oriented psychotherapy 45 min - CPT code 32615    Chief complaint/reason for encounter: anxiety     Interval history and content of current session: Pt presents as euthymic today. She continues to enjoy her job and shared all of the different ways that having a better, non-stressful job has improved her overall life and wellbeing. 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 Pt reports that she is finding herself to be taking other stressors in stride now (such as house repairs) since her baseline is more relaxed and at ease. She would like to have one more maintenance appointment in 3 months.    Treatment plan:  · Target symptoms: anxiety   · Why chosen therapy is appropriate versus another modality: relevant to diagnosis, patient responds to this modality  · Outcome monitoring methods: self-report, observation  · Therapeutic intervention type: insight oriented psychotherapy    Risk parameters:  Patient reports no suicidal ideation  Patient reports no homicidal ideation  Patient reports no self-injurious behavior  Patient reports no violent behavior    Verbal deficits: None    Patient's response to intervention:  The patient's response to intervention is accepting.    Progress toward goals and other mental status changes:  The patient's progress toward goals is good.    Diagnosis:   Anxiety Disorder NOS  Major Depressive Disorder, in full remission.    Plan:  individual psychotherapy     Return to clinic: 3 months    Length of Service (minutes): 45

## 2022-05-18 ENCOUNTER — OFFICE VISIT (OUTPATIENT)
Dept: GASTROENTEROLOGY | Facility: CLINIC | Age: 37
End: 2022-05-18
Payer: COMMERCIAL

## 2022-05-18 DIAGNOSIS — K21.9 GASTROESOPHAGEAL REFLUX DISEASE WITHOUT ESOPHAGITIS: ICD-10-CM

## 2022-05-18 PROCEDURE — 99213 PR OFFICE/OUTPT VISIT, EST, LEVL III, 20-29 MIN: ICD-10-PCS | Mod: 95,,, | Performed by: INTERNAL MEDICINE

## 2022-05-18 PROCEDURE — 99213 OFFICE O/P EST LOW 20 MIN: CPT | Mod: 95,,, | Performed by: INTERNAL MEDICINE

## 2022-05-18 RX ORDER — PANTOPRAZOLE SODIUM 40 MG/1
40 TABLET, DELAYED RELEASE ORAL 2 TIMES DAILY
Qty: 60 TABLET | Refills: 11 | Status: SHIPPED | OUTPATIENT
Start: 2022-05-18 | End: 2023-05-08

## 2022-05-18 NOTE — PROGRESS NOTES
The patient location is: [place]  The chief complaint leading to consultation is: GERD    Visit type: audiovisual    Face to Face time with patient: 5  10 minutes of total time spent on the encounter, which includes face to face time and non-face to face time preparing to see the patient (eg, review of tests), Obtaining and/or reviewing separately obtained history, Documenting clinical information in the electronic or other health record, Independently interpreting results (not separately reported) and communicating results to the patient/family/caregiver, or Care coordination (not separately reported).         Each patient to whom he or she provides medical services by telemedicine is:  (1) informed of the relationship between the physician and patient and the respective role of any other health care provider with respect to management of the patient; and (2) notified that he or she may decline to receive medical services by telemedicine and may withdraw from such care at any time.    Notes:     Please see Dr. Overton's note dated 6/19/20 for full previous GI work up and previous GI symptoms      [facility]    CC: abdominal pain    HPI 37 y.o. female who presents with recent worsening of her chronic, intermittent, burning epigastric abdominal pain without vomiting.  She reports coffee began to give her heartburn not controlled with Protonix 40mg BID.  Once she stopped drinking coffee her symptoms resolved.     She does occasionally feel a globus sensation but it is tolerate and she does not wish to resume elavil at this time (it did help her previously).   Past Medical History  Past Medical History:   Diagnosis Date    Anxiety     C. difficile colitis     GERD (gastroesophageal reflux disease)     Hx of psychiatric care     Hypertension     Irritable bowel syndrome     Migraines     Psychiatric problem     Therapy          Review of Systems  General ROS: negative for chills, fever or weight  loss  Cardiovascular ROS: no chest pain or dyspnea on exertion  Gastrointestinal ROS: no vomiting, change in bowel habits, or black/ bloody stools    Physical Examination  There were no vitals taken for this visit.  General appearance: alert, cooperative, no distress  HENT: Normocephalic, atraumatic, neck symmetrical, no nasal discharge   Neurologic: Alert and oriented X 3, no dysarthria or facial droop    Labs:  Lab Results   Component Value Date    WBC 14.52 (H) 11/03/2021    HGB 14.4 11/03/2021    HCT 43.6 11/03/2021    MCV 92 11/03/2021     (H) 11/03/2021         CMP  Sodium   Date Value Ref Range Status   11/03/2021 140 136 - 145 mmol/L Final     Potassium   Date Value Ref Range Status   11/03/2021 4.0 3.5 - 5.1 mmol/L Final     Chloride   Date Value Ref Range Status   11/03/2021 105 95 - 110 mmol/L Final     CO2   Date Value Ref Range Status   11/03/2021 22 (L) 23 - 29 mmol/L Final     Glucose   Date Value Ref Range Status   11/03/2021 75 70 - 110 mg/dL Final     BUN   Date Value Ref Range Status   11/03/2021 13 6 - 20 mg/dL Final     Creatinine   Date Value Ref Range Status   11/03/2021 0.9 0.5 - 1.4 mg/dL Final     Calcium   Date Value Ref Range Status   11/03/2021 10.0 8.7 - 10.5 mg/dL Final     Total Protein   Date Value Ref Range Status   11/03/2021 7.7 6.0 - 8.4 g/dL Final     Albumin   Date Value Ref Range Status   11/03/2021 4.3 3.5 - 5.2 g/dL Final     Total Bilirubin   Date Value Ref Range Status   11/03/2021 0.4 0.1 - 1.0 mg/dL Final     Comment:     For infants and newborns, interpretation of results should be based  on gestational age, weight and in agreement with clinical  observations.    Premature Infant recommended reference ranges:  Up to 24 hours.............<8.0 mg/dL  Up to 48 hours............<12.0 mg/dL  3-5 days..................<15.0 mg/dL  6-29 days.................<15.0 mg/dL       Alkaline Phosphatase   Date Value Ref Range Status   11/03/2021 106 55 - 135 U/L Final     AST    Date Value Ref Range Status   11/03/2021 20 10 - 40 U/L Final     ALT   Date Value Ref Range Status   11/03/2021 23 10 - 44 U/L Final     Anion Gap   Date Value Ref Range Status   11/03/2021 13 8 - 16 mmol/L Final     eGFR if    Date Value Ref Range Status   11/03/2021 >60.0 >60 mL/min/1.73 m^2 Final     eGFR if non    Date Value Ref Range Status   11/03/2021 >60.0 >60 mL/min/1.73 m^2 Final     Comment:     Calculation used to obtain the estimated glomerular filtration  rate (eGFR) is the CKD-EPI equation.            Assessment:   The patient is a 38 yo female who presents for follow up of GERD and an intermittent globus sensation.    Plan:  -Continue Protonix 40mg BID.  Ok to take famotidine (Pepcid) as needed for breakthrough symptoms.    Return to clinic as needed.    Annia Johnston MD

## 2022-07-13 ENCOUNTER — OFFICE VISIT (OUTPATIENT)
Dept: PSYCHIATRY | Facility: CLINIC | Age: 37
End: 2022-07-13
Payer: COMMERCIAL

## 2022-07-13 DIAGNOSIS — F41.9 ANXIETY: Primary | ICD-10-CM

## 2022-07-13 DIAGNOSIS — F33.42 MDD (MAJOR DEPRESSIVE DISORDER), RECURRENT, IN FULL REMISSION: ICD-10-CM

## 2022-07-13 PROCEDURE — 90834 PR PSYCHOTHERAPY W/PATIENT, 45 MIN: ICD-10-PCS | Mod: S$GLB,,, | Performed by: PSYCHOLOGIST

## 2022-07-13 PROCEDURE — 90834 PSYTX W PT 45 MINUTES: CPT | Mod: S$GLB,,, | Performed by: PSYCHOLOGIST
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
## 2022-07-19 ENCOUNTER — PATIENT MESSAGE (OUTPATIENT)
Dept: GASTROENTEROLOGY | Facility: CLINIC | Age: 37
End: 2022-07-19
Payer: COMMERCIAL

## 2022-08-04 ENCOUNTER — OFFICE VISIT (OUTPATIENT)
Dept: URGENT CARE | Facility: CLINIC | Age: 37
End: 2022-08-04
Payer: COMMERCIAL

## 2022-08-04 VITALS
HEIGHT: 66 IN | SYSTOLIC BLOOD PRESSURE: 119 MMHG | DIASTOLIC BLOOD PRESSURE: 87 MMHG | TEMPERATURE: 99 F | WEIGHT: 215 LBS | HEART RATE: 91 BPM | BODY MASS INDEX: 34.55 KG/M2 | RESPIRATION RATE: 14 BRPM | OXYGEN SATURATION: 98 %

## 2022-08-04 DIAGNOSIS — J02.9 SORE THROAT: ICD-10-CM

## 2022-08-04 DIAGNOSIS — U07.1 COVID-19: Primary | ICD-10-CM

## 2022-08-04 LAB
CTP QC/QA: YES
MOLECULAR STREP A: NEGATIVE

## 2022-08-04 PROCEDURE — 1159F PR MEDICATION LIST DOCUMENTED IN MEDICAL RECORD: ICD-10-PCS | Mod: CPTII,S$GLB,,

## 2022-08-04 PROCEDURE — 1160F PR REVIEW ALL MEDS BY PRESCRIBER/CLIN PHARMACIST DOCUMENTED: ICD-10-PCS | Mod: CPTII,S$GLB,,

## 2022-08-04 PROCEDURE — 99213 OFFICE O/P EST LOW 20 MIN: CPT | Mod: S$GLB,,,

## 2022-08-04 PROCEDURE — 3079F DIAST BP 80-89 MM HG: CPT | Mod: CPTII,S$GLB,,

## 2022-08-04 PROCEDURE — 1160F RVW MEDS BY RX/DR IN RCRD: CPT | Mod: CPTII,S$GLB,,

## 2022-08-04 PROCEDURE — 3074F PR MOST RECENT SYSTOLIC BLOOD PRESSURE < 130 MM HG: ICD-10-PCS | Mod: CPTII,S$GLB,,

## 2022-08-04 PROCEDURE — 3074F SYST BP LT 130 MM HG: CPT | Mod: CPTII,S$GLB,,

## 2022-08-04 PROCEDURE — 3008F PR BODY MASS INDEX (BMI) DOCUMENTED: ICD-10-PCS | Mod: CPTII,S$GLB,,

## 2022-08-04 PROCEDURE — 99213 PR OFFICE/OUTPT VISIT, EST, LEVL III, 20-29 MIN: ICD-10-PCS | Mod: S$GLB,,,

## 2022-08-04 PROCEDURE — 87651 POCT STREP A MOLECULAR: ICD-10-PCS | Mod: QW,S$GLB,,

## 2022-08-04 PROCEDURE — 3008F BODY MASS INDEX DOCD: CPT | Mod: CPTII,S$GLB,,

## 2022-08-04 PROCEDURE — 3079F PR MOST RECENT DIASTOLIC BLOOD PRESSURE 80-89 MM HG: ICD-10-PCS | Mod: CPTII,S$GLB,,

## 2022-08-04 PROCEDURE — 87651 STREP A DNA AMP PROBE: CPT | Mod: QW,S$GLB,,

## 2022-08-04 PROCEDURE — 1159F MED LIST DOCD IN RCRD: CPT | Mod: CPTII,S$GLB,,

## 2022-08-04 RX ORDER — SPIRONOLACTONE 100 MG/1
100 TABLET, FILM COATED ORAL DAILY
COMMUNITY
Start: 2022-08-01

## 2022-08-04 RX ORDER — DAPSONE 75 MG/G
GEL TOPICAL
COMMUNITY
Start: 2022-06-29 | End: 2024-03-12

## 2022-08-04 RX ORDER — FEXOFENADINE HCL AND PSEUDOEPHEDRINE HCI 180; 240 MG/1; MG/1
1 TABLET, EXTENDED RELEASE ORAL
COMMUNITY
End: 2024-03-12 | Stop reason: ALTCHOICE

## 2022-08-04 RX ORDER — SPIRONOLACTONE 25 MG/1
25 TABLET ORAL DAILY
COMMUNITY
Start: 2022-08-01

## 2022-08-05 NOTE — PATIENT INSTRUCTIONS
- Rest.    - Drink plenty of fluids.    - Acetaminophen (tylenol) or Ibuprofen (advil,motrin) as directed as needed for fever/pain. Avoid tylenol if you have a history of liver disease. Do not take ibuprofen if you have a history of GI bleeding, kidney disease, or if you take blood thinners.   - Ibuprofen dosing for adults: 400 mg by mouth every 4-6 hours as needed. Max: 2400 mg/day; Info: use lowest effective dose, shortest effective treatment duration; give w/ food if GI upset occurs.  - Ibuprofen dosing for children: [6 mo-12 yo] Dose: 5-10 mg/kg/dose by mouth every 6-8h as needed; Max: 40 mg/kg/day; Info: use lower dose for fever <102.5 F, higher dose for fever >102.5 F; use shortest effective tx duration; give w/ food if GI upset occurs. [13 yo and older] Dose: 200-400 mg by mouth every 4-6 hours as needed; Max: 1200 mg/day; Info: use lowest effective dose, shortest effective tx duration; give w/ food if GI upset occurs.  - Tylenol dosing for adults: [By mouth route, immediate-release form] Dose: 325-1000 mg by mouth every 4-6h as needed; Max: 1 g/4h and 4 g/day from all sources. [By mouth route, extended-release form] Dose: 650-1300 mg Extended Release by mouth every 8h as needed; Max: 4 g/day from all sources.   - Tylenol dosing for children: 6-12 yo [ oral tablet/capsule ] Dose: 1 tab/cap by mouth every 4-6h as needed; Max: 5 tabs or caps/24h; Info: do not exceed 75 mg/kg/day, up to 1 g/4h and 4 g/day, from all sources. 13 yo and older [ oral tablet/capsule ] Dose: 1-2 tabs/caps by mouth every 4-6h as needed; Max: 10 tabs or caps/24h; Info: do not exceed 1 g/4h and 4 g/day from all sources.    - You must understand that you have received an Urgent Care treatment only and that you may be released before all of your medical problems are known or treated.   - You, the patient, will arrange for follow up care as instructed.   - If your condition worsens or fails to improve we recommend that you receive another  evaluation at the ER immediately or contact your PCP to discuss your concerns or return here.   - Follow up with your PCP or specialty clinic as directed in the next 1-2 weeks if not improved or as needed.  You can call [phone] to schedule an appointment with the appropriate provider.    If your symptoms do not improve or worsen, go to the emergency room immediately.

## 2022-08-05 NOTE — PROGRESS NOTES
"Subjective:       Patient ID: Lou Lucio is a 37 y.o. female.    Vitals:  height is 5' 6" (1.676 m) and weight is 97.5 kg (215 lb). Her oral temperature is 99 °F (37.2 °C). Her blood pressure is 119/87 and her pulse is 91. Her respiration is 14 and oxygen saturation is 98%.     Chief Complaint: Sore Throat    36 y/o female complains of sore throat since Sunday, 7/31/2022. She reports seeing white spots in the back of her throat. She notes that she tested positive for COVID-19 with an at-home test on Monday, 8/1/22. Her symptoms that prompted her to take the at-home COVID test included sore throat, cough, fatigue, headache, fever, chills, body aches, and GI distress. These symptoms started Sunday, 7/31/2022, and have mostly resolved aside from her sore throat. In fact, her sore throat has worsened. She is concerned she has strep throat and would like to be tested for strep. She continues on Tylenol and last took 2 tablets of Tylenol extra strength at 12:40 pm today.    Sore Throat   This is a new problem. The current episode started in the past 7 days (5 days ago). The problem has been gradually worsening. There has been no fever. The pain is at a severity of 7/10. The pain is severe. Associated symptoms include congestion, coughing, ear pain, swollen glands and trouble swallowing. Pertinent negatives include no abdominal pain, diarrhea, drooling, ear discharge, headaches, hoarse voice, plugged ear sensation, neck pain, shortness of breath, stridor or vomiting. She has had no exposure to strep or mono. She has tried acetaminophen for the symptoms. The treatment provided mild relief.       Constitution: Positive for fatigue.   HENT: Positive for ear pain, congestion, sore throat and trouble swallowing. Negative for ear discharge and drooling.    Neck: Negative for neck pain.   Respiratory: Positive for cough. Negative for shortness of breath and stridor.    Gastrointestinal: Negative for abdominal pain, vomiting and " diarrhea.   Allergic/Immunologic: Negative for itching and sneezing.   Neurological: Negative for headaches, disorientation and altered mental status.   Psychiatric/Behavioral: Negative for altered mental status, disorientation and confusion.       Objective:      Physical Exam   Constitutional: She is oriented to person, place, and time. She appears well-developed. She is cooperative.  Non-toxic appearance. She does not appear ill. No distress.      Comments:Patient sits comfortably in exam chair. Answers questions in complete sentences. Does not show any signs of distress or discoloration.        HENT:   Head: Normocephalic and atraumatic.   Ears:   Right Ear: Hearing, tympanic membrane, external ear and ear canal normal.   Left Ear: Hearing, tympanic membrane, external ear and ear canal normal.   Nose: Rhinorrhea and congestion present. No mucosal edema or nasal deformity. No epistaxis. Right sinus exhibits no maxillary sinus tenderness and no frontal sinus tenderness. Left sinus exhibits no maxillary sinus tenderness and no frontal sinus tenderness.   Mouth/Throat: Uvula is midline and mucous membranes are normal. No trismus in the jaw. Normal dentition. No uvula swelling. Posterior oropharyngeal erythema present. No oropharyngeal exudate or posterior oropharyngeal edema.   Eyes: Conjunctivae and lids are normal. No scleral icterus.   Neck: Trachea normal and phonation normal. Neck supple. No edema present. No erythema present. No neck rigidity present.   Cardiovascular: Normal rate, regular rhythm, normal heart sounds and normal pulses.   Pulmonary/Chest: Effort normal and breath sounds normal. No stridor. No respiratory distress. She has no decreased breath sounds. She has no wheezes. She has no rhonchi. She has no rales.   Abdominal: Normal appearance.   Musculoskeletal: Normal range of motion.         General: No deformity. Normal range of motion.   Neurological: She is alert and oriented to person, place,  and time. She exhibits normal muscle tone. Coordination normal.   Skin: Skin is warm, dry, intact, not diaphoretic and not pale.   Psychiatric: Her speech is normal and behavior is normal. Judgment and thought content normal.   Nursing note and vitals reviewed.        Results for orders placed or performed in visit on 08/04/22   POCT Strep A, Molecular   Result Value Ref Range    Molecular Strep A, POC Negative Negative     Acceptable Yes        Assessment:       1. COVID-19    2. Sore throat          Plan:         COVID-19    Sore throat  -     POCT Strep A, Molecular  -     (Magic mouthwash) 1:1:1 diphenhydramine(Benadryl) 12.5mg/5ml liq, aluminum & magnesium hydroxide-simethicone (Maalox), LIDOcaine viscous 2%; Swish and spit 15 mLs every 4 (four) hours as needed (sore throat). Sore throat  Dispense: 180 mL; Refill: 0                 Patient Instructions   - Rest.    - Drink plenty of fluids.    - Acetaminophen (tylenol) or Ibuprofen (advil,motrin) as directed as needed for fever/pain. Avoid tylenol if you have a history of liver disease. Do not take ibuprofen if you have a history of GI bleeding, kidney disease, or if you take blood thinners.   - Ibuprofen dosing for adults: 400 mg by mouth every 4-6 hours as needed. Max: 2400 mg/day; Info: use lowest effective dose, shortest effective treatment duration; give w/ food if GI upset occurs.  - Ibuprofen dosing for children: [6 mo-10 yo] Dose: 5-10 mg/kg/dose by mouth every 6-8h as needed; Max: 40 mg/kg/day; Info: use lower dose for fever <102.5 F, higher dose for fever >102.5 F; use shortest effective tx duration; give w/ food if GI upset occurs. [11 yo and older] Dose: 200-400 mg by mouth every 4-6 hours as needed; Max: 1200 mg/day; Info: use lowest effective dose, shortest effective tx duration; give w/ food if GI upset occurs.  - Tylenol dosing for adults: [By mouth route, immediate-release form] Dose: 325-1000 mg by mouth every 4-6h as needed;  Max: 1 g/4h and 4 g/day from all sources. [By mouth route, extended-release form] Dose: 650-1300 mg Extended Release by mouth every 8h as needed; Max: 4 g/day from all sources.   - Tylenol dosing for children: 6-12 yo [ oral tablet/capsule ] Dose: 1 tab/cap by mouth every 4-6h as needed; Max: 5 tabs or caps/24h; Info: do not exceed 75 mg/kg/day, up to 1 g/4h and 4 g/day, from all sources. 13 yo and older [ oral tablet/capsule ] Dose: 1-2 tabs/caps by mouth every 4-6h as needed; Max: 10 tabs or caps/24h; Info: do not exceed 1 g/4h and 4 g/day from all sources.    - You must understand that you have received an Urgent Care treatment only and that you may be released before all of your medical problems are known or treated.   - You, the patient, will arrange for follow up care as instructed.   - If your condition worsens or fails to improve we recommend that you receive another evaluation at the ER immediately or contact your PCP to discuss your concerns or return here.   - Follow up with your PCP or specialty clinic as directed in the next 1-2 weeks if not improved or as needed.  You can call [phone] to schedule an appointment with the appropriate provider.    If your symptoms do not improve or worsen, go to the emergency room immediately.

## 2022-10-19 ENCOUNTER — OFFICE VISIT (OUTPATIENT)
Dept: PSYCHIATRY | Facility: CLINIC | Age: 37
End: 2022-10-19
Payer: COMMERCIAL

## 2022-10-19 DIAGNOSIS — F41.1 GAD (GENERALIZED ANXIETY DISORDER): Primary | ICD-10-CM

## 2022-10-19 PROCEDURE — 90834 PSYTX W PT 45 MINUTES: CPT | Mod: S$GLB,,, | Performed by: PSYCHOLOGIST

## 2022-10-19 PROCEDURE — 90834 PR PSYCHOTHERAPY W/PATIENT, 45 MIN: ICD-10-PCS | Mod: S$GLB,,, | Performed by: PSYCHOLOGIST

## 2022-10-19 NOTE — PROGRESS NOTES
"Individual Psychotherapy (PhD/LCSW)    10/19/2022    Site:  [facility]         Therapeutic Intervention: Met with patient.  Outpatient - Insight oriented psychotherapy 45 min - CPT code 89997    Chief complaint/reason for encounter: anxiety     Interval history and content of current session: Pt presents as anxious today. She is having persistent negative thoughts and a "sense of doom" that is bothering her. She shares that the work on her house from [place] is almost complete, and that these thoughts and feelings have escalated when she thought they should decrease. 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 Helped pt work on how she can balance our thoughts, use defusion skills to separate from thoughts, and give problems back to others (ie her ) that are not hers to own.    Treatment plan:  Target symptoms: anxiety   Why chosen therapy is appropriate versus another modality: relevant to diagnosis, patient responds to this modality  Outcome monitoring methods: self-report, observation  Therapeutic intervention type: insight oriented psychotherapy    Risk parameters:  Patient reports no suicidal ideation  Patient reports no homicidal ideation  Patient reports no self-injurious behavior  Patient reports no violent behavior    Verbal deficits: None    Patient's response to intervention:  The patient's response to intervention is accepting.    Progress toward goals and other mental status changes:  The patient's progress toward goals is good.    Diagnosis:   Anxiety Disorder NOS  Major Depressive Disorder, in full remission.    Plan:  individual psychotherapy     Return to clinic: 3 months    Length of Service (minutes): 45      "

## 2022-11-16 ENCOUNTER — PATIENT MESSAGE (OUTPATIENT)
Dept: PSYCHIATRY | Facility: CLINIC | Age: 37
End: 2022-11-16
Payer: COMMERCIAL

## 2022-11-16 ENCOUNTER — OFFICE VISIT (OUTPATIENT)
Dept: PSYCHIATRY | Facility: CLINIC | Age: 37
End: 2022-11-16
Payer: COMMERCIAL

## 2022-11-16 DIAGNOSIS — F41.1 GAD (GENERALIZED ANXIETY DISORDER): Primary | ICD-10-CM

## 2022-11-16 PROCEDURE — 90834 PSYTX W PT 45 MINUTES: CPT | Mod: S$GLB,,, | Performed by: PSYCHOLOGIST

## 2022-11-16 PROCEDURE — 90834 PR PSYCHOTHERAPY W/PATIENT, 45 MIN: ICD-10-PCS | Mod: S$GLB,,, | Performed by: PSYCHOLOGIST

## 2022-11-16 NOTE — PROGRESS NOTES
Individual Psychotherapy (PhD/LCSW)    11/16/2022    Site:  [facility]         Therapeutic Intervention: Met with patient.  Outpatient - Insight oriented psychotherapy 45 min - CPT code 05388    Chief complaint/reason for encounter: anxiety     Interval history and content of current session: Pt presents as sad today. She reports that the situation with her house resolved, and she has been working hard on noticing the more positive aspects of the house. She realized that this house represents the first safe place she has ever lived, and having work done and intrusions constantly had skyrocketed her anxiety. Now pt reports that her relationship with her  feels very rachell. She feels that she is doing a lot for him in terms of managing the house and their space, and he is not receptive to taking responsibility for those things. Additionally, they have not had sex or any kind of intimacy in over a year. Pt is very frustrated by this, and feels that there may be something physically wrong with  that he is not addressing and refuses to look at. Pt reports feeling unloved and uncared for with their lack of sex life, and states that he has not been receptive to her attempts to discuss it. We explored new ways that she could bring up this issue, specifically using the DEAR MAN skill and I statements to help him better understand the impact this is having on her.    Treatment plan:  Target symptoms: anxiety   Why chosen therapy is appropriate versus another modality: relevant to diagnosis, patient responds to this modality  Outcome monitoring methods: self-report, observation  Therapeutic intervention type: insight oriented psychotherapy    Risk parameters:  Patient reports no suicidal ideation  Patient reports no homicidal ideation  Patient reports no self-injurious behavior  Patient reports no violent behavior    Verbal deficits: None    Patient's response to intervention:  The patient's response to  intervention is accepting.    Progress toward goals and other mental status changes:  The patient's progress toward goals is good.    Diagnosis:   Anxiety Disorder NOS  Major Depressive Disorder, in full remission.    Plan:  individual psychotherapy     Return to clinic: 3 months    Length of Service (minutes): 45

## 2023-01-04 ENCOUNTER — OFFICE VISIT (OUTPATIENT)
Dept: PSYCHIATRY | Facility: CLINIC | Age: 38
End: 2023-01-04
Payer: COMMERCIAL

## 2023-01-04 DIAGNOSIS — F41.1 GAD (GENERALIZED ANXIETY DISORDER): Primary | ICD-10-CM

## 2023-01-04 PROCEDURE — 90834 PSYTX W PT 45 MINUTES: CPT | Mod: S$GLB,,, | Performed by: PSYCHOLOGIST

## 2023-01-04 PROCEDURE — 90834 PR PSYCHOTHERAPY W/PATIENT, 45 MIN: ICD-10-PCS | Mod: S$GLB,,, | Performed by: PSYCHOLOGIST

## 2023-01-04 NOTE — PROGRESS NOTES
Individual Psychotherapy (PhD/LCSW)    1/4/2023    Site:  [facility]         Therapeutic Intervention: Met with patient.  Outpatient - Insight oriented psychotherapy 45 min - CPT code 02219    Chief complaint/reason for encounter: anxiety     Interval history and content of current session: Pt presents as euthymic today. She reports that she had a very good holiday season, and felt that she could really get into celebrating for the first time in years. She provides positive updates on her sex life as well. Pt was able to talk about her concerns with her  in a more direct, feelings oriented way. Her  was able to hear her concerns and scheduled a visit to get medication so that he could participate in their sex life more. Pt has since been able to have sex with her  and feels really good about it. She realized how much of her self-worth and confidence were tied up in the lack of sex between them for over one year. She reports several positive developments in their relationship since having sex, including more positive time together, improved communication, and discussion as to how to continue improving and enjoying their sex life now that they have started something new.    Treatment plan:  Target symptoms: anxiety   Why chosen therapy is appropriate versus another modality: relevant to diagnosis, patient responds to this modality  Outcome monitoring methods: self-report, observation  Therapeutic intervention type: insight oriented psychotherapy    Risk parameters:  Patient reports no suicidal ideation  Patient reports no homicidal ideation  Patient reports no self-injurious behavior  Patient reports no violent behavior    Verbal deficits: None    Patient's response to intervention:  The patient's response to intervention is accepting.    Progress toward goals and other mental status changes:  The patient's progress toward goals is good.    Diagnosis:   Anxiety Disorder NOS  Major  Depressive Disorder, in full remission.    Plan:  individual psychotherapy     Return to clinic: 3 months    Length of Service (minutes): 45

## 2023-03-13 ENCOUNTER — OFFICE VISIT (OUTPATIENT)
Dept: PSYCHIATRY | Facility: CLINIC | Age: 38
End: 2023-03-13
Payer: COMMERCIAL

## 2023-03-13 DIAGNOSIS — F41.1 GAD (GENERALIZED ANXIETY DISORDER): Primary | ICD-10-CM

## 2023-03-13 PROCEDURE — 90834 PR PSYCHOTHERAPY W/PATIENT, 45 MIN: ICD-10-PCS | Mod: S$GLB,,, | Performed by: PSYCHOLOGIST

## 2023-03-13 PROCEDURE — 90834 PSYTX W PT 45 MINUTES: CPT | Mod: S$GLB,,, | Performed by: PSYCHOLOGIST
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
## 2023-05-08 DIAGNOSIS — K21.9 GASTROESOPHAGEAL REFLUX DISEASE WITHOUT ESOPHAGITIS: ICD-10-CM

## 2023-05-08 RX ORDER — PANTOPRAZOLE SODIUM 40 MG/1
TABLET, DELAYED RELEASE ORAL
Qty: 60 TABLET | Refills: 11 | Status: SHIPPED | OUTPATIENT
Start: 2023-05-08 | End: 2024-03-12

## 2023-05-17 ENCOUNTER — OFFICE VISIT (OUTPATIENT)
Dept: PSYCHIATRY | Facility: CLINIC | Age: 38
End: 2023-05-17
Payer: COMMERCIAL

## 2023-05-17 DIAGNOSIS — F41.1 GAD (GENERALIZED ANXIETY DISORDER): Primary | ICD-10-CM

## 2023-05-17 PROCEDURE — 90834 PR PSYCHOTHERAPY W/PATIENT, 45 MIN: ICD-10-PCS | Mod: S$GLB,,, | Performed by: PSYCHOLOGIST

## 2023-05-17 PROCEDURE — 90834 PSYTX W PT 45 MINUTES: CPT | Mod: S$GLB,,, | Performed by: PSYCHOLOGIST

## 2023-05-17 NOTE — PROGRESS NOTES
Individual Psychotherapy (PhD/LCSW)    5/17/2023    Site:  [facility]         Therapeutic Intervention: Met with patient.  Outpatient - Insight oriented psychotherapy 45 min - CPT code 83516    Chief complaint/reason for encounter: anxiety     Interval history and content of current session: Pt presents as euthymic today. As this is our last therapy session due to my relocation, time was spent reviewing pt's treatment, which began in 2018. Pt describes many ways that she feels therapy has helped her and allowed her better insight as well as the ability to create tools she can use in the future to help with her anxiety, decision making, and communication style. She expressed a lot of gratitude for treatment. She reports that she does not need to continue with therapy at this time.    Treatment plan:  Target symptoms: anxiety   Why chosen therapy is appropriate versus another modality: relevant to diagnosis, patient responds to this modality  Outcome monitoring methods: self-report, observation  Therapeutic intervention type: insight oriented psychotherapy    Risk parameters:  Patient reports no suicidal ideation  Patient reports no homicidal ideation  Patient reports no self-injurious behavior  Patient reports no violent behavior    Verbal deficits: None    Patient's response to intervention:  The patient's response to intervention is accepting.    Progress toward goals and other mental status changes:  The patient's progress toward goals is good.    Diagnosis:   Anxiety Disorder NOS  Major Depressive Disorder, in full remission.    Plan:  Termination of therapy due to therapist relocation and pt feeling treatment goals have been met.    Return to clinic: N/A    Length of Service (minutes): 45

## 2024-01-24 ENCOUNTER — TELEPHONE (OUTPATIENT)
Dept: GASTROENTEROLOGY | Facility: CLINIC | Age: 39
End: 2024-01-24
Payer: COMMERCIAL

## 2024-01-24 NOTE — TELEPHONE ENCOUNTER
----- Message from Harrison Yunier sent at 1/24/2024 11:46 AM CST -----  Regarding: Self [phone]  Type:  Patient Returning Call    Who Called:  Self     Who Left Message for Patient:  unknown     Does the patient know what this is regarding?: yes     Would the patient rather a call back or a response via [facility]?  Call back     Best Call Back Number: [phone]     Additional Information:     Thank you.

## 2024-01-24 NOTE — TELEPHONE ENCOUNTER
MA returned patient's call, but she did not answer. A voicemail was left requesting patient to return a call to our office.

## 2024-01-25 ENCOUNTER — OFFICE VISIT (OUTPATIENT)
Dept: GASTROENTEROLOGY | Facility: CLINIC | Age: 39
End: 2024-01-25
Payer: COMMERCIAL

## 2024-01-25 DIAGNOSIS — K21.9 GASTROESOPHAGEAL REFLUX DISEASE, UNSPECIFIED WHETHER ESOPHAGITIS PRESENT: Primary | ICD-10-CM

## 2024-01-25 DIAGNOSIS — R11.0 NAUSEA: ICD-10-CM

## 2024-01-25 PROCEDURE — 99213 OFFICE O/P EST LOW 20 MIN: CPT | Mod: 95,,, | Performed by: INTERNAL MEDICINE

## 2024-01-25 RX ORDER — PANTOPRAZOLE SODIUM 40 MG/1
40 TABLET, DELAYED RELEASE ORAL 2 TIMES DAILY
Qty: 60 TABLET | Refills: 11 | Status: SHIPPED | OUTPATIENT
Start: 2024-01-25 | End: 2025-01-24

## 2024-01-25 RX ORDER — ONDANSETRON 4 MG/1
4 TABLET, ORALLY DISINTEGRATING ORAL EVERY 6 HOURS PRN
Qty: 30 TABLET | Refills: 6 | Status: SHIPPED | OUTPATIENT
Start: 2024-01-25

## 2024-01-25 RX ORDER — FAMOTIDINE 20 MG/1
20 TABLET, FILM COATED ORAL 2 TIMES DAILY PRN
Qty: 60 TABLET | Refills: 11 | Status: SHIPPED | OUTPATIENT
Start: 2024-01-25 | End: 2025-01-24

## 2024-01-25 NOTE — PROGRESS NOTES
The patient location is: [place]  The chief complaint leading to consultation is: heartburn    Visit type: audiovisual    Face to Face time with patient: 5 minutes  10 minutes of total time spent on the encounter, which includes face to face time and non-face to face time preparing to see the patient (eg, review of tests), Obtaining and/or reviewing separately obtained history, Documenting clinical information in the electronic or other health record, Independently interpreting results (not separately reported) and communicating results to the patient/family/caregiver, or Care coordination (not separately reported).         Each patient to whom he or she provides medical services by telemedicine is:  (1) informed of the relationship between the physician and patient and the respective role of any other health care provider with respect to management of the patient; and (2) notified that he or she may decline to receive medical services by telemedicine and may withdraw from such care at any time.    Notes:       [facility]    Please see Dr. Overton's note dated 6/19/20 for full previous GI work up and previous GI symptoms     CC: Heartburn    HPI 38 y.o. female with past medical history of GERD who presents for follow up of chronic, intermittent, breakthrough heartburn symptoms with associated nausea.    Overall her GERD is well controlled with Protonix 40mg BID with PRN famotidine for breakthrough symptoms.    Her rumination and nausea are controlled with diaphragmatic breathing and zofran as needed.    Past Medical History  Past Medical History:   Diagnosis Date    Anxiety     C. difficile colitis     GERD (gastroesophageal reflux disease)     Hx of psychiatric care     Hypertension     Irritable bowel syndrome     Migraines     Psychiatric problem     Therapy        Physical Examination  There were no vitals taken for this visit.  General appearance: alert, cooperative, no distress  HENT:  Normocephalic, atraumatic, neck symmetrical, no nasal discharge   Neurologic: Alert and oriented X 3, no facial droop or dysarthria    Labs:  Lab Results   Component Value Date    WBC 14.52 (H) 11/03/2021    HGB 14.4 11/03/2021    HCT 43.6 11/03/2021    MCV 92 11/03/2021     (H) 11/03/2021         CMP  Sodium   Date Value Ref Range Status   11/03/2021 140 136 - 145 mmol/L Final     Potassium   Date Value Ref Range Status   11/03/2021 4.0 3.5 - 5.1 mmol/L Final     Chloride   Date Value Ref Range Status   11/03/2021 105 95 - 110 mmol/L Final     CO2   Date Value Ref Range Status   11/03/2021 22 (L) 23 - 29 mmol/L Final     Glucose   Date Value Ref Range Status   11/03/2021 75 70 - 110 mg/dL Final     BUN   Date Value Ref Range Status   11/03/2021 13 6 - 20 mg/dL Final     Creatinine   Date Value Ref Range Status   11/03/2021 0.9 0.5 - 1.4 mg/dL Final     Calcium   Date Value Ref Range Status   11/03/2021 10.0 8.7 - 10.5 mg/dL Final     Total Protein   Date Value Ref Range Status   11/03/2021 7.7 6.0 - 8.4 g/dL Final     Albumin   Date Value Ref Range Status   11/03/2021 4.3 3.5 - 5.2 g/dL Final     Total Bilirubin   Date Value Ref Range Status   11/03/2021 0.4 0.1 - 1.0 mg/dL Final     Comment:     For infants and newborns, interpretation of results should be based  on gestational age, weight and in agreement with clinical  observations.    Premature Infant recommended reference ranges:  Up to 24 hours.............<8.0 mg/dL  Up to 48 hours............<12.0 mg/dL  3-5 days..................<15.0 mg/dL  6-29 days.................<15.0 mg/dL       Alkaline Phosphatase   Date Value Ref Range Status   11/03/2021 106 55 - 135 U/L Final     AST   Date Value Ref Range Status   11/03/2021 20 10 - 40 U/L Final     ALT   Date Value Ref Range Status   11/03/2021 23 10 - 44 U/L Final     Anion Gap   Date Value Ref Range Status   11/03/2021 13 8 - 16 mmol/L Final           Assessment:   The patient is a 39 yo female with  GERD and rumination syndrome overall doing well on her current regimen.    Plan:  -Continue Protonix 40mg twice per day with Famotidine 20mg twice per day as needed for breakthrough symptoms.  This was re prescribed for her today.  -Zofran re prescribed for nausea symptoms.    Annia Johnston MD

## 2024-03-12 ENCOUNTER — OFFICE VISIT (OUTPATIENT)
Dept: ALLERGY | Facility: CLINIC | Age: 39
End: 2024-03-12
Payer: COMMERCIAL

## 2024-03-12 ENCOUNTER — LAB VISIT (OUTPATIENT)
Dept: LAB | Facility: HOSPITAL | Age: 39
End: 2024-03-12
Attending: STUDENT IN AN ORGANIZED HEALTH CARE EDUCATION/TRAINING PROGRAM
Payer: COMMERCIAL

## 2024-03-12 VITALS
OXYGEN SATURATION: 98 % | HEART RATE: 99 BPM | WEIGHT: 211.88 LBS | DIASTOLIC BLOOD PRESSURE: 90 MMHG | BODY MASS INDEX: 34.2 KG/M2 | SYSTOLIC BLOOD PRESSURE: 125 MMHG

## 2024-03-12 DIAGNOSIS — J32.9 FREQUENT EPISODES OF SINUSITIS: ICD-10-CM

## 2024-03-12 DIAGNOSIS — J31.0 CHRONIC RHINITIS: ICD-10-CM

## 2024-03-12 DIAGNOSIS — I10 HYPERTENSION, UNSPECIFIED TYPE: ICD-10-CM

## 2024-03-12 DIAGNOSIS — J01.90 ACUTE SINUSITIS, RECURRENCE NOT SPECIFIED, UNSPECIFIED LOCATION: ICD-10-CM

## 2024-03-12 DIAGNOSIS — K21.9 GASTROESOPHAGEAL REFLUX DISEASE, UNSPECIFIED WHETHER ESOPHAGITIS PRESENT: ICD-10-CM

## 2024-03-12 DIAGNOSIS — J31.0 CHRONIC RHINITIS: Primary | ICD-10-CM

## 2024-03-12 DIAGNOSIS — F41.9 ANXIETY: ICD-10-CM

## 2024-03-12 PROBLEM — G43.109 MIGRAINE WITH AURA: Status: ACTIVE | Noted: 2022-07-01

## 2024-03-12 PROBLEM — E53.9 VITAMIN B DEFICIENCY: Status: ACTIVE | Noted: 2024-03-12

## 2024-03-12 PROBLEM — G51.0 BELL'S PALSY: Status: ACTIVE | Noted: 2022-07-01

## 2024-03-12 PROBLEM — E55.9 VITAMIN D DEFICIENCY: Status: ACTIVE | Noted: 2022-07-01

## 2024-03-12 LAB — IGE SERPL-ACNC: 37 IU/ML (ref 0–100)

## 2024-03-12 PROCEDURE — 1159F MED LIST DOCD IN RCRD: CPT | Mod: CPTII,S$GLB,, | Performed by: STUDENT IN AN ORGANIZED HEALTH CARE EDUCATION/TRAINING PROGRAM

## 2024-03-12 PROCEDURE — 3008F BODY MASS INDEX DOCD: CPT | Mod: CPTII,S$GLB,, | Performed by: STUDENT IN AN ORGANIZED HEALTH CARE EDUCATION/TRAINING PROGRAM

## 2024-03-12 PROCEDURE — 86003 ALLG SPEC IGE CRUDE XTRC EA: CPT | Performed by: STUDENT IN AN ORGANIZED HEALTH CARE EDUCATION/TRAINING PROGRAM

## 2024-03-12 PROCEDURE — 99999 PR PBB SHADOW E&M-EST. PATIENT-LVL V: CPT | Mod: PBBFAC,,, | Performed by: STUDENT IN AN ORGANIZED HEALTH CARE EDUCATION/TRAINING PROGRAM

## 2024-03-12 PROCEDURE — 82785 ASSAY OF IGE: CPT | Performed by: STUDENT IN AN ORGANIZED HEALTH CARE EDUCATION/TRAINING PROGRAM

## 2024-03-12 PROCEDURE — 99205 OFFICE O/P NEW HI 60 MIN: CPT | Mod: S$GLB,,, | Performed by: STUDENT IN AN ORGANIZED HEALTH CARE EDUCATION/TRAINING PROGRAM

## 2024-03-12 PROCEDURE — 86003 ALLG SPEC IGE CRUDE XTRC EA: CPT | Mod: 59 | Performed by: STUDENT IN AN ORGANIZED HEALTH CARE EDUCATION/TRAINING PROGRAM

## 2024-03-12 PROCEDURE — 3080F DIAST BP >= 90 MM HG: CPT | Mod: CPTII,S$GLB,, | Performed by: STUDENT IN AN ORGANIZED HEALTH CARE EDUCATION/TRAINING PROGRAM

## 2024-03-12 PROCEDURE — 36415 COLL VENOUS BLD VENIPUNCTURE: CPT | Performed by: STUDENT IN AN ORGANIZED HEALTH CARE EDUCATION/TRAINING PROGRAM

## 2024-03-12 PROCEDURE — 1160F RVW MEDS BY RX/DR IN RCRD: CPT | Mod: CPTII,S$GLB,, | Performed by: STUDENT IN AN ORGANIZED HEALTH CARE EDUCATION/TRAINING PROGRAM

## 2024-03-12 PROCEDURE — 99417 PROLNG OP E/M EACH 15 MIN: CPT | Mod: S$GLB,,, | Performed by: STUDENT IN AN ORGANIZED HEALTH CARE EDUCATION/TRAINING PROGRAM

## 2024-03-12 PROCEDURE — 3074F SYST BP LT 130 MM HG: CPT | Mod: CPTII,S$GLB,, | Performed by: STUDENT IN AN ORGANIZED HEALTH CARE EDUCATION/TRAINING PROGRAM

## 2024-03-12 RX ORDER — RIZATRIPTAN BENZOATE 10 MG/1
TABLET ORAL
COMMUNITY

## 2024-03-12 RX ORDER — ELETRIPTAN HYDROBROMIDE 40 MG/1
TABLET, FILM COATED ORAL
COMMUNITY

## 2024-03-12 RX ORDER — HYDROXYZINE PAMOATE 25 MG/1
CAPSULE ORAL
COMMUNITY

## 2024-03-12 RX ORDER — BROMPHENIRAMINE MALEATE, PSEUDOEPHEDRINE HYDROCHLORIDE, AND DEXTROMETHORPHAN HYDROBROMIDE 2; 30; 10 MG/5ML; MG/5ML; MG/5ML
SYRUP ORAL
COMMUNITY
End: 2024-03-12 | Stop reason: ALTCHOICE

## 2024-03-12 RX ORDER — OXYCODONE AND ACETAMINOPHEN 5; 325 MG/1; MG/1
TABLET ORAL
COMMUNITY

## 2024-03-12 RX ORDER — CLINDAMYCIN HYDROCHLORIDE 300 MG/1
CAPSULE ORAL
COMMUNITY

## 2024-03-12 RX ORDER — LOTEPREDNOL ETABONATE AND TOBRAMYCIN 5; 3 MG/ML; MG/ML
SUSPENSION/ DROPS OPHTHALMIC
COMMUNITY

## 2024-03-12 RX ORDER — FLUCONAZOLE 150 MG/1
TABLET ORAL
COMMUNITY
Start: 2024-02-10

## 2024-03-12 RX ORDER — AMITRIPTYLINE HYDROCHLORIDE 50 MG/1
TABLET, FILM COATED ORAL
COMMUNITY

## 2024-03-12 RX ORDER — CYCLOSPORINE 0.5 MG/ML
EMULSION OPHTHALMIC
COMMUNITY

## 2024-03-12 RX ORDER — AMOXICILLIN AND CLAVULANATE POTASSIUM 875; 125 MG/1; MG/1
TABLET, FILM COATED ORAL
COMMUNITY

## 2024-03-12 RX ORDER — AZITHROMYCIN 250 MG/1
TABLET, FILM COATED ORAL
COMMUNITY

## 2024-03-12 RX ORDER — VENLAFAXINE HYDROCHLORIDE 37.5 MG/1
CAPSULE, EXTENDED RELEASE ORAL
COMMUNITY

## 2024-03-12 RX ORDER — TRAMADOL HYDROCHLORIDE 50 MG/1
TABLET ORAL
COMMUNITY

## 2024-03-12 RX ORDER — PSEUDOEPHEDRINE HCL 120 MG/1
TABLET, FILM COATED, EXTENDED RELEASE ORAL
Qty: 30 TABLET | Refills: 2 | Status: SHIPPED | OUTPATIENT
Start: 2024-03-12

## 2024-03-12 RX ORDER — FLUTICASONE PROPIONATE 50 MCG
2 SPRAY, SUSPENSION (ML) NASAL 2 TIMES DAILY
Qty: 31.6 ML | Refills: 5 | Status: SHIPPED | OUTPATIENT
Start: 2024-03-12

## 2024-03-12 RX ORDER — ROFLUMILAST 3 MG/G
AEROSOL, FOAM TOPICAL
COMMUNITY
Start: 2024-02-16

## 2024-03-12 RX ORDER — SUCRALFATE 1 G/10ML
SUSPENSION ORAL
COMMUNITY

## 2024-03-12 RX ORDER — HALOBETASOL PROPIONATE 0.5 MG/G
AEROSOL, FOAM TOPICAL
COMMUNITY

## 2024-03-12 RX ORDER — AMITRIPTYLINE HYDROCHLORIDE 10 MG/1
TABLET, FILM COATED ORAL
COMMUNITY

## 2024-03-12 RX ORDER — TIZANIDINE 4 MG/1
4 TABLET ORAL
COMMUNITY
Start: 2023-11-02

## 2024-03-12 RX ORDER — CELECOXIB 100 MG/1
CAPSULE ORAL
COMMUNITY

## 2024-03-12 RX ORDER — DESOXIMETASONE 2.5 MG/G
1 OINTMENT TOPICAL 2 TIMES DAILY
COMMUNITY

## 2024-03-12 RX ORDER — METOCLOPRAMIDE 10 MG/1
TABLET ORAL
COMMUNITY

## 2024-03-12 RX ORDER — AMOXICILLIN 500 MG/1
CAPSULE ORAL
COMMUNITY

## 2024-03-12 RX ORDER — IVERMECTIN 10 MG/G
CREAM TOPICAL
COMMUNITY

## 2024-03-12 RX ORDER — HYOSCYAMINE SULFATE 0.125 MG
TABLET ORAL
COMMUNITY

## 2024-03-12 RX ORDER — OSELTAMIVIR PHOSPHATE 75 MG/1
CAPSULE ORAL
COMMUNITY

## 2024-03-12 RX ORDER — CEPHALEXIN 500 MG/1
CAPSULE ORAL
COMMUNITY

## 2024-03-12 RX ORDER — OMEPRAZOLE 40 MG/1
CAPSULE, DELAYED RELEASE ORAL
COMMUNITY

## 2024-03-12 RX ORDER — CEFDINIR 300 MG/1
CAPSULE ORAL
COMMUNITY

## 2024-03-12 RX ORDER — SULFAMETHOXAZOLE AND TRIMETHOPRIM 800; 160 MG/1; MG/1
TABLET ORAL
COMMUNITY

## 2024-03-12 RX ORDER — POLYETHYLENE GLYCOL 3350, SODIUM SULFATE ANHYDROUS, SODIUM BICARBONATE, SODIUM CHLORIDE, POTASSIUM CHLORIDE 236; 22.74; 6.74; 5.86; 2.97 G/4L; G/4L; G/4L; G/4L; G/4L
POWDER, FOR SOLUTION ORAL
COMMUNITY

## 2024-03-12 RX ORDER — RUXOLITINIB 15 MG/G
CREAM TOPICAL
COMMUNITY

## 2024-03-12 RX ORDER — PHENAZOPYRIDINE HYDROCHLORIDE 200 MG/1
TABLET, FILM COATED ORAL
COMMUNITY

## 2024-03-12 NOTE — PATIENT INSTRUCTIONS
Suspect allergies and allergic sinusitis.  Reflux may be contributing to throat symptoms    Testing  Blood work for allergy testing today       Check [facility] in one week for results or call [phone]       Contact me with questions or concerns       I will contact you if anything needs immediate attention.        Preventive Treatment    Flonase (= fluticasone) nasal spray:  2 squirts each nostril twice a day   Remember to aim out toward your ear.   Needs to be used regularly 5-14 days for full effect.    (Can take antihistamine of choice as needed for itching/sneezing/runny nose)    Treatment of sinusitis episodes     Add Saline -- spay and/or squeeze bottle at least once a day  Nasal rinse  E.g. Neimed Sinus Rinse = distilled water + packet         It's important not to use tap or regular bottled water.    If you don't have distilled water, you can boil tap water to sterilize it, then let it cool.    Nasal rinse recipe  1 cup DISTILLED water (warm or room temperature)  1/2 teaspoon salt  1/4 teaspoon baking soda    2.  Add decongestant (Sudafed) in AM    3.  Add Benadryl (or chlorpheniramine) at bedtime       - recommend thyroid ultrasound   - keflex x 5 days  - follow up in office in 2 weeks   - call with questions, x [phone] - recommend thyroid ultrasound outpatient   - keflex x 5 days  - follow up in office in 2 weeks   - call with questions, x [phone]

## 2024-03-12 NOTE — PROGRESS NOTES
"  Allergy Clinic Note  [facility]    This note was created by combination of typed  and dictation. Transcription errors are likely.  If there are any questions, please contact me.    Subjective:      Patient ID: Lou Lucio is a 39 y.o. female.    Chief Complaint: Allergies, Sinus Problem, and Sinusitis (First consultation for Allergies and Sinus issues, main symptoms are post nasal drip, congestion, sneezing, sore throat and coughing. Started since 2019.  In Dec. 2023, 2 x visits to urgent care and was put on ABX, currently still taking ABX.  Has had an ENT visit.  )      Referring Provider:      History of Present Illness: Lou Lucio is a 39 y.o. female who presents complaining of frequent sinus infections, severe since late November 2023.    Related medications and other interventions  Pantaprozole 40 mg twice a day  Pepcid as needed      3/12/2024:  At initial visit, linda reported near constant sinus infections  for the last 4 months ( since December 2023).  She describes a mild baseline rhinitis consisting of sneezing and sensation of lots of mucus in her throat.  At the onset of sinusitis episodes she notices an increase in postnasal drip sensation and nasal discomfort that she describes as "like the feeling right before you sneeze. "   Symptoms during flares include cough, throat clearing, sore throat, hoarseness but no dysphagia.  She also has dry, stuffy nose with this sensation that she needs to sneeze and sometimes has nosebleeds.     Other symptoms include ear fullness and popping, runny nose, runny eyes, and itchingWhen symptoms are severe she finds it difficult to take a deep breath but denies cary air hunger.In addition she reports that her hand dermatitis worsens.  Her baseline medicines are antihistamine of choice and Flonase   One squirt at bedtime.  During sinusitis episodes She usually treats herself by swabbing Allegra-D for her morning antihistamine, adding Benadryl at HS " and adding simply saline spray.  She is frequently treated with antibiotics at urgent care , including at present  She reported definite improvement on systemic steroids.  Her baseline medicines are antihistamine of choice and Flonase 1 squirt at bedtime.   She has never had allergy testing.     Aside from her frequent sinusitis, infection history is benign.  She has no history of pneumonia, otitis as an adult, or severe or unusual infections.  She did have a bout of  prolonged diarrhea from C diff which cleared rapidly after starting treatment.       MEDICAL HISTORY      Significant past medical history:  GERD, brad, IBS, migraines  Active problem list reviewed  ENT surgery:  None   Significant family history:  Father and brother with rhinitis.  Brother also has asthma and food anaphylaxis.  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 She has never been exposed to tobacco smoke. She has never used smokeless tobacco.    Meds: MAR reviewed    Asthma: No  Eczema:  Yes.  On hands describes symptoms of dyshidrotic eczema.  Also reports intermittent dry patch or sometimes plaque on forehead.  Rhinitis: Yes.  See HPI  Drug allergy/intolerance: NKDA  Venom allergy:  No  Latex allergy:  No    Patient Active Problem List   Diagnosis    Diarrhea    Dysphagia    Rumination syndrome of ingested food in adult    Bell's palsy    Gastroesophageal reflux disease without esophagitis    Hypertension    Migraine with aura    Vitamin B deficiency    Vitamin D deficiency     Medication List with Changes/Refills   New Medications    FLUTICASONE PROPIONATE (FLONASE) 50 MCG/ACTUATION NASAL SPRAY    2 sprays (100 mcg total) by Each Nostril route 2 (two) times daily.       Start Date: 3/12/2024 End Date: --    PSEUDOEPHEDRINE (SUDAFED 12 HOUR) 120 MG 12 HR TABLET    Take 1 in AM during sinusitis episodes        Start Date: 3/12/2024 End Date: --   Current Medications    AMITRIPTYLINE (ELAVIL) 10 MG TABLET           Start Date: --        End Date: --    AMITRIPTYLINE (ELAVIL) 50 MG TABLET           Start Date: --        End Date: --    AMOXICILLIN (AMOXIL) 500 MG CAPSULE           Start Date: --        End Date: --    AMOXICILLIN-CLAVULANATE 875-125MG (AUGMENTIN) 875-125 MG PER TABLET           Start Date: --        End Date: --    AZITHROMYCIN (Z-NEETU) 250 MG TABLET           Start Date: --        End Date: --    BYSTOLIC 5 MG TAB    TK 1 T PO QD IN THE EMEKA       Start Date: 3/26/2018 End Date: --    CEFDINIR (OMNICEF) 300 MG CAPSULE           Start Date: --        End Date: --    CELECOXIB (CELEBREX) 100 MG CAPSULE    TAKE 1 CAPSULE BY MOUTH EVERY DAY AS NEEDED FOR 30 DAYS       Start Date: --        End Date: --    CEPHALEXIN (KEFLEX) 500 MG CAPSULE           Start Date: --        End Date: --    CETIRIZINE (ZYRTEC) 10 MG TABLET    Take 10 mg by mouth once daily.       Start Date: --        End Date: --    CLINDAMYCIN (CLEOCIN) 300 MG CAPSULE           Start Date: --        End Date: --    CYCLOSPORINE (RESTASIS) 0.05 % OPHTHALMIC EMULSION           Start Date: --        End Date: --    DESOXIMETASONE 0.25 % OINTMENT    1 application  2 (two) times daily.       Start Date: --        End Date: --    ELETRIPTAN (RELPAX) 40 MG TABLET    TAKE 1 TABLET BY MOUTH EVERY 2-4 HOURS AS NEEDED FOR HEADACHE; NO MORE THAN 2 A DAY OR 6 PER WEEK       Start Date: --        End Date: --    ERGOCALCIFEROL (ERGOCALCIFEROL) 50,000 UNIT CAP    Take 50,000 Units by mouth every 7 days.       Start Date: --        End Date: --    FAMOTIDINE (PEPCID) 20 MG TABLET    Take 1 tablet (20 mg total) by mouth 2 (two) times daily as needed for Heartburn (and acid reflux not controlled with Pantoprazole).       Start Date: 1/25/2024 End Date: 1/24/2025    FEXOFENADINE (ALLEGRA) 60 MG TABLET    Take 60 mg by mouth once daily.       Start Date: --         End Date: --    FLUCONAZOLE (DIFLUCAN) 150 MG TAB    TAKE 1 TABLET BY MOUTH EVERY DAY FOR 1 DAY       Start Date: 2/10/2024 End Date: --    HALOBETASOL PROPIONATE (LEXETTE) 0.05 % FOAM           Start Date: --        End Date: --    HYDROXYZINE PAMOATE (VISTARIL) 25 MG CAP    TAKE ONE CAPSULE BY MOUTH THREE TIMES DAILY AS NEEDED FOR ITCHING       Start Date: --        End Date: --    HYOSCYAMINE (ANASPAZ,LEVSIN) 0.125 MG TAB           Start Date: --        End Date: --    LORATADINE (CLARITIN) 10 MG TABLET    Take 10 mg by mouth once daily.       Start Date: --        End Date: --    METOCLOPRAMIDE HCL (REGLAN) 10 MG TABLET    TK 1 T PO  UP TO TID PRN       Start Date: --        End Date: --    OMEPRAZOLE (PRILOSEC) 40 MG CAPSULE    Take 1 capsule every day by oral route.       Start Date: --        End Date: --    ONDANSETRON (ZOFRAN-ODT) 4 MG TBDL    Take 1 tablet (4 mg total) by mouth every 6 (six) hours as needed (nausea and vomiting).       Start Date: 1/25/2024 End Date: --    OPZELURA 1.5 % CREA    APPLY TO AFFECTED AREA TWICE DAILY FOLLOWED BY MOISTURIZER.       Start Date: --        End Date: --    OSELTAMIVIR (TAMIFLU) 75 MG CAPSULE    TAKE ONE CAPSULE BY MOUTH TWICE DAILY FOR 5 DAYS       Start Date: --        End Date: --    OXYCODONE-ACETAMINOPHEN (PERCOCET) 5-325 MG PER TABLET           Start Date: --        End Date: --    PANTOPRAZOLE (PROTONIX) 40 MG TABLET    Take 1 tablet (40 mg total) by mouth 2 (two) times daily.       Start Date: 1/25/2024 End Date: 1/24/2025    PHENAZOPYRIDINE (PYRIDIUM) 200 MG TABLET           Start Date: --        End Date: --    POLYETHYLENE GLYCOL (GAVILYTE-G) 236-22.74-6.74 -5.86 GRAM SUSPENSION           Start Date: --        End Date: --    PROMETHAZINE-DEXTROMETHORPHAN (PROMETHAZINE-DM) 6.25-15 MG/5 ML SYRP    Take 5 mLs by mouth nightly as needed (cough that keeps you awake at night).       Start Date: 12/8/2021 End Date: --    RIZATRIPTAN (MAXALT) 10 MG TABLET     Take 1 tablet as needed by oral route as needed for 30 days.       Start Date: --        End Date: --    SOOLANTRA 1 % CREA    APPLY TO FACE NIGHTLY       Start Date: --        End Date: --    SPIRONOLACTONE (ALDACTONE) 100 MG TABLET    Take 100 mg by mouth once daily.       Start Date: 8/1/2022  End Date: --    SPIRONOLACTONE (ALDACTONE) 25 MG TABLET    Take 25 mg by mouth once daily.       Start Date: 8/1/2022  End Date: --    SUCRALFATE (CARAFATE) 100 MG/ML SUSPENSION           Start Date: --        End Date: --    SULFAMETHOXAZOLE-TRIMETHOPRIM 800-160MG (BACTRIM DS) 800-160 MG TAB           Start Date: --        End Date: --    SUMATRIPTAN (IMITREX) 100 MG TABLET    Take 100 mg by mouth every 2 (two) hours as needed.       Start Date: --        End Date: --    SUMATRIPTAN 10 MG/ACTUATION SPRY    by Nasal route.       Start Date: --        End Date: --    TIZANIDINE (ZANAFLEX) 4 MG TABLET    Take 4 mg by mouth.       Start Date: 11/2/2023 End Date: --    TOBRAMYCIN-LOTEPRED (ZYLET) 0.3-0.5 % DRPS           Start Date: --        End Date: --    TRAMADOL (ULTRAM) 50 MG TABLET           Start Date: --        End Date: --    VENLAFAXINE (EFFEXOR-XR) 37.5 MG 24 HR CAPSULE           Start Date: --        End Date: --    ZORYVE 0.3 % FOAM    APPLY TO SCALP ONCE DAILY       Start Date: 2/16/2024 End Date: --   Discontinued Medications    ASCORBIC ACID, VITAMIN C, (VITAMIN C) 500 MG TABLET    Take 500 mg by mouth once daily.       Start Date: --        End Date: 3/12/2024    AZELASTINE (ASTELIN) 137 MCG (0.1 %) NASAL SPRAY    1 spray (137 mcg total) by Nasal route 2 (two) times daily.       Start Date: 12/8/2021 End Date: 3/12/2024    BENZONATATE (TESSALON PERLES) 100 MG CAPSULE    Take 1 capsule (100 mg total) by mouth 3 (three) times daily as needed for Cough.       Start Date: 12/8/2021 End Date: 3/12/2024    BROMPHENIRAMINE-PSEUDOEPH-DM (BROMFED DM) 2-30-10 MG/5 ML SYRP           Start Date: --        End Date:  3/12/2024    DAPSONE (ACZONE) 7.5 % GLWP    Apply topically.       Start Date: 6/29/2022 End Date: 3/12/2024    ESCITALOPRAM OXALATE (LEXAPRO) 10 MG TABLET    Take 1 tablet (10 mg total) by mouth once daily.       Start Date: 11/9/2021 End Date: 3/12/2024    FEXOFENADINE-PSEUDOEPHEDRINE (ALLEGRA-D 24) 180-240 MG PER 24 HR TABLET    Take 1 tablet by mouth.       Start Date: --        End Date: 3/12/2024    FLUTICASONE PROPIONATE (FLONASE) 50 MCG/ACTUATION NASAL SPRAY    1 spray by Each Nostril route once daily.       Start Date: --        End Date: 3/12/2024    METHOCARBAMOL (ROBAXIN) 500 MG TAB    Take 500 mg by mouth once.       Start Date: --        End Date: 3/12/2024    ONDANSETRON (ZOFRAN) 8 MG TABLET    Take 1 tablet (8 mg total) by mouth every 8 (eight) hours as needed for Nausea.       Start Date: 4/10/2018 End Date: 3/12/2024    ONDANSETRON (ZOFRAN-ODT) 4 MG TBDL    DISSOLVE 1 TABLET(4 MG) ON THE TONGUE EVERY 6 HOURS AS NEEDED FOR NAUSEA       Start Date: 7/19/2022 End Date: 3/12/2024    PANTOPRAZOLE (PROTONIX) 40 MG TABLET    TAKE 1 TABLET(40 MG) BY MOUTH TWICE DAILY       Start Date: 5/8/2023  End Date: 3/12/2024         REVIEW OF SYSTEMS      CONST: no F/C/NS, no unintentional weight changes  NEURO:  no tremor, no weakness  EYES: no discharge, no erythema  EARS: no hearing loss, no sensation of fullness  PULM:  no SOB, no wheezing, no cough  CV: no CP, no palpitations  DERM: no rashes, no skin breaks    PHYSICAL EXAM     BP (!) 125/90 (BP Location: Left arm, Patient Position: Sitting, BP Method: Medium (Automatic))   Pulse 99   Wt 96.1 kg (211 lb 13.8 oz)   SpO2 98%   BMI 34.20 kg/m²   GEN: Awake and alert, no distress  DERM:  No flushing, no rashes  EYE:  No occular discharge, no redness  HEENT: No nasal discharge, no hoarseness, TMs are clear bilaterally.  Nares are  pale  and also friable with significant turbinate swelling.  Oropharynx is benign without exudate.  Tongue is not coated.  NECK:     Shotty high anterior cervical adenopathy  PULM: Normal work of breathing, no cough, CTA  COR:  RRR, normal capillary refill  NEURO:  No focal deficit, speech fluent and logical  PSYCH: appropriate affect, normal behavior      MEDICAL DECISION MAKING     Data reviewed (new entries in bold-face)      Allergy Testing      Ordered      Lab results      No recent labs     2 previous CBC's show elevated WBC around 15 K with increased granulocytes (2018, 2021).  Interval CBC (2019) unremarkable.    Normal IgA 6 (2018)     IgG 98, 160 (2018-19)  EO count 300, 2019     Imaging and other diagnostics      No chest x-ray available      Medical records review           Diagnoses:     Lou Lucio is a 39 y.o. female. with  1. Chronic rhinitis    2. Acute sinusitis    3. Frequent episodes of sinusitis    4. Gastroesophageal reflux disease, unspecified whether esophagitis present    5. Hypertension, unspecified type    6. Anxiety          Plan:    Client is presenting with mild baseline rhinitis and frequent episodes of superimposed sinusitis.  Some of the sinusitis episodes are associated with fever and therefore probably infectious  (viral or bacterial).  Others may represent allergic sinusitis.   Diagnostically, I am recommend screening for aeroallergens by the Immunocap method.  Therapeutically, I recommend quadrupling her dose of Flonase and changing her antihistamines to as needed.  During episodes, I recommend pseudoephedrine 120 mg in a.m. and nasal rinses (or saline nasal spray ) as often as needed to keep drainage pathways open.     Aside from her sinusitis episodes, infection history is benign.  I am not recommending any immune testing at this time.      Chronic rhinitis  -     IgE; Future; Expected date: 03/12/2024  -     Dermatophagoides Ray; Future; Expected date: 03/12/2024  -     Dermatophagoides Pteronyssinus; Future; Expected date: 03/12/2024  -     [place]; Future; Expected date: 03/12/2024  -     Francisco;  Future; Expected date: 03/12/2024  -     [place]; Future; Expected date: 03/12/2024  -     English Plantain; Future; Expected date: 03/12/2024  -     Oak; Future; Expected date: 03/12/2024  -     Pecan; Future; Expected date: 03/12/2024  -     Ragweed; Future; Expected date: 03/12/2024  -     Alternaria; Future; Expected date: 03/12/2024  -     Aspergillus; Future; Expected date: 03/12/2024  -     Cat; Future; Expected date: 03/12/2024  -     Dog; Future; Expected date: 03/12/2024  -     Allergen-[place]; Future; Expected date: 04/12/2024  -     Allergen [place] IgE; Future; Expected date: 03/12/2024  -     Allergen [place] Grass IgE; Future; Expected date: 03/12/2024  -     Allergen, [place]; Future; Expected date: 03/12/2024  -     Allergen, [place]; Future; Expected date: 03/12/2024  -     Allergen-[place]; Future; Expected date: 03/12/2024  -     Pollen, walnut IgE; Future; Expected date: 03/12/2024  -     [place], black IgE; Future; Expected date: 03/12/2024  -     fluticasone propionate (FLONASE) 50 mcg/actuation nasal spray; 2 sprays (100 mcg total) by Each Nostril route 2 (two) times daily.  Dispense: 31.6 mL; Refill: 5  -     pseudoephedrine (SUDAFED 12 HOUR) 120 mg 12 hr tablet; Take 1 in AM during sinusitis episodes  Dispense: 30 tablet; Refill: 2    Acute sinusitis       -  Continue Augmentin    Frequent episodes of sinusitis       -   Flonase as above    GERD --  likely contributing to some throat symptoms       Continue pantoprazole 40 mg twice daily    Comorbidities  Anxiety and HTN --  very judicious use of oral decongestants      PATIENT INSTRUCTIONS AND FOLLOW-UP     Patient Instructions   Suspect allergies and allergic sinusitis.  Reflux may be contributing to throat symptoms    Testing  Blood work for allergy testing today       Check [facility] in one week for results or call [phone]       Contact me with questions or concerns       I will contact you if anything needs  immediate attention.        Preventive Treatment    Flonase (= fluticasone) nasal spray:  2 squirts each nostril twice a day   Remember to aim out toward your ear.   Needs to be used regularly 5-14 days for full effect.    (Can take antihistamine of choice as needed for itching/sneezing/runny nose)    Treatment of sinusitis episodes     Add Saline -- spay and/or squeeze bottle at least once a day  Nasal rinse  E.g. [place] Sinus Rinse = distilled water + packet         It's important not to use tap or regular bottled water.    If you don't have distilled water, you can boil tap water to sterilize it, then let it cool.    Nasal rinse recipe  1 cup DISTILLED water (warm or room temperature)  1/2 teaspoon salt  1/4 teaspoon baking soda    2.  Add decongestant (Sudafed) in AM    3.  Add Benadryl (or chlorpheniramine) at bedtime        Follow up in about 4 weeks (around 4/9/2024).        Niki Farah MD  Allergy, Asthma & Immunology        I spent a total of 81 minutes on the day of the visit.This includes face to face time and non-face to face time preparing to see the patient (eg, review of tests), obtaining and/or reviewing separately obtained history, documenting clinical information in the electronic or other health record, independently interpreting results and communicating results to the patient/family/caregiver, or care coordinator.

## 2024-03-15 LAB
A ALTERNATA IGE QN: <0.1 KU/L
A FUMIGATUS IGE QN: <0.1 KU/L
ALLERGEN WALNUT TREE IGE: <0.1 KU/L
ALLERGEN WHITE ASH TREE IGE: <0.1 KU/L
BAHIA GRASS IGE QN: <0.1 KU/L
BERMUDA GRASS IGE QN: <0.1 KU/L
CAT DANDER IGE QN: <0.1 KU/L
CEDAR IGE QN: <0.1 KU/L
D FARINAE IGE QN: <0.1 KU/L
D PTERONYSS IGE QN: <0.1 KU/L
DEPRECATED A ALTERNATA IGE RAST QL: NORMAL
DEPRECATED A FUMIGATUS IGE RAST QL: NORMAL
DEPRECATED BAHIA GRASS IGE RAST QL: NORMAL
DEPRECATED BERMUDA GRASS IGE RAST QL: NORMAL
DEPRECATED CAT DANDER IGE RAST QL: NORMAL
DEPRECATED CEDAR IGE RAST QL: NORMAL
DEPRECATED D FARINAE IGE RAST QL: NORMAL
DEPRECATED D PTERONYSS IGE RAST QL: NORMAL
DEPRECATED DOG DANDER IGE RAST QL: NORMAL
DEPRECATED ENGL PLANTAIN IGE RAST QL: NORMAL
DEPRECATED JOHNSON GRASS IGE RAST QL: NORMAL
DEPRECATED LONDON PLANE IGE RAST QL: NORMAL
DEPRECATED PECAN/HICK TREE IGE RAST QL: NORMAL
DEPRECATED SILVER BIRCH IGE RAST QL: NORMAL
DEPRECATED TIMOTHY IGE RAST QL: NORMAL
DEPRECATED WEST RAGWEED IGE RAST QL: ABNORMAL
DEPRECATED WHITE OAK IGE RAST QL: NORMAL
DEPRECATED WILLOW IGE RAST QL: NORMAL
DOG DANDER IGE QN: <0.1 KU/L
ELM CEDAR CLASS: NORMAL
ELM CEDAR, IGE: <0.1 KU/L
ENGL PLANTAIN IGE QN: <0.1 KU/L
JOHNSON GRASS IGE QN: <0.1 KU/L
LONDON PLANE IGE QN: <0.1 KU/L
PECAN/HICK TREE IGE QN: <0.1 KU/L
SILVER BIRCH IGE QN: <0.1 KU/L
TIMOTHY IGE QN: <0.1 KU/L
WALNUT TREE CLASS: NORMAL
WEST RAGWEED IGE QN: 0.29 KU/L
WHITE ASH CLASS: NORMAL
WHITE OAK IGE QN: <0.1 KU/L
WILLOW IGE QN: <0.1 KU/L

## 2024-03-18 ENCOUNTER — PATIENT MESSAGE (OUTPATIENT)
Dept: ALLERGY | Facility: CLINIC | Age: 39
End: 2024-03-18
Payer: COMMERCIAL

## 2024-04-08 NOTE — PROGRESS NOTES
"    Allergy Clinic Note  [facility]    This note was created by combination of typed  and dictation. Transcription errors are likely.  If there are any questions, please contact me.    Subjective:      Patient ID: Lou Lucio is a 39 y.o. female.    Chief Complaint: Follow-up      Referring Provider:      History of Present Illness: Lou Lucio is a 39 y.o. female who with chronic rhinitis and frequent sinusitis returns to follow up symptoms and test results.  She is here alone, and she is a good historian    Related medications and other interventions  Pantaprozole 40 mg twice a day  Pepcid as needed  Flonase 2 squirts each nostril twice daily  Saline nasal spray as needed  Benadryl as needed  Sudafed as needed    04/09/2024:  Client's acute sinusitis resolved following last visit and she has not had any further sinusitis episodes.  On Flonase 2 squirts each nostril twice daily, she began to have some early warning symptoms.  Specifically she describes itching of her throat and note and lump of mucus in throat.   She started Benadryl to at HS saline spray and Sudafed.  Her symptoms have not escalated to a full-blown sinus infection.  She does report dry nose, thick nasal secretions, and an occasional nosebleed.  Physical exam was notable for bright red nares on the right with small amount heme.  Again reviewed negative immuno caps, which are surprising given her itching.  Plan follow-up skin testing 04/15/2024    3/12/2024:  At initial visit, linda reported near constant sinus infections  for the last 4 months ( since December 2023).  She describes a mild baseline rhinitis consisting of sneezing and sensation of lots of mucus in her throat.  At the onset of sinusitis episodes she notices an increase in postnasal drip sensation and nasal discomfort that she describes as "like the feeling right before you sneeze. "   Symptoms during flares include cough, throat clearing, sore throat, hoarseness " but no dysphagia.  She also has dry, stuffy nose with this sensation that she needs to sneeze and sometimes has nosebleeds.     Other symptoms include ear fullness and popping, runny nose, runny eyes, and itchingWhen symptoms are severe she finds it difficult to take a deep breath but denies cary air hunger.In addition she reports that her hand dermatitis worsens.  Her baseline medicines are antihistamine of choice and Flonase   One squirt at bedtime.  During sinusitis episodes She usually treats herself by swabbing Allegra-D for her morning antihistamine, adding Benadryl at HS and adding simply saline spray.  She is frequently treated with antibiotics at urgent care , including at present  She reported definite improvement on systemic steroids.  Her baseline medicines are antihistamine of choice and Flonase 1 squirt at bedtime.   She has never had allergy testing.     Aside from her frequent sinusitis, infection history is benign.  She has no history of pneumonia, otitis as an adult, or severe or unusual infections.  She did have a bout of  prolonged diarrhea from C diff which cleared rapidly after starting treatment.       MEDICAL HISTORY      Significant past medical history:  GERD, brad, IBS, migraines  Active problem list reviewed  ENT surgery:  None   Significant family history:  Father and brother with rhinitis.  Brother also has asthma and food anaphylaxis.  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 She has never been exposed to tobacco smoke. She has never used smokeless tobacco.    Meds: MAR reviewed    Asthma: No  Eczema:  Yes.  On hands describes symptoms of dyshidrotic eczema.  Also reports intermittent dry patch or sometimes plaque on forehead.  Rhinitis: Yes.  See HPI  Drug allergy/intolerance: NKDA  Venom allergy:  No  Latex allergy:  No    Patient Active  Problem List   Diagnosis    Diarrhea    Dysphagia    Rumination syndrome of ingested food in adult    Bell's palsy    Gastroesophageal reflux disease without esophagitis    Hypertension    Migraine with aura    Vitamin B deficiency    Vitamin D deficiency     Medication List with Changes/Refills   Current Medications    AMITRIPTYLINE (ELAVIL) 10 MG TABLET           Start Date: --        End Date: --    AMITRIPTYLINE (ELAVIL) 50 MG TABLET           Start Date: --        End Date: --    AMOXICILLIN (AMOXIL) 500 MG CAPSULE           Start Date: --        End Date: --    AMOXICILLIN-CLAVULANATE 875-125MG (AUGMENTIN) 875-125 MG PER TABLET           Start Date: --        End Date: --    AZITHROMYCIN (Z-NEETU) 250 MG TABLET           Start Date: --        End Date: --    BYSTOLIC 5 MG TAB    TK 1 T PO QD IN THE EMEKA       Start Date: 3/26/2018 End Date: --    CEFDINIR (OMNICEF) 300 MG CAPSULE           Start Date: --        End Date: --    CELECOXIB (CELEBREX) 100 MG CAPSULE    TAKE 1 CAPSULE BY MOUTH EVERY DAY AS NEEDED FOR 30 DAYS       Start Date: --        End Date: --    CEPHALEXIN (KEFLEX) 500 MG CAPSULE           Start Date: --        End Date: --    CETIRIZINE (ZYRTEC) 10 MG TABLET    Take 10 mg by mouth once daily.       Start Date: --        End Date: --    CLINDAMYCIN (CLEOCIN) 300 MG CAPSULE           Start Date: --        End Date: --    CYCLOSPORINE (RESTASIS) 0.05 % OPHTHALMIC EMULSION           Start Date: --        End Date: --    DESOXIMETASONE 0.25 % OINTMENT    1 application  2 (two) times daily.       Start Date: --        End Date: --    ELETRIPTAN (RELPAX) 40 MG TABLET    TAKE 1 TABLET BY MOUTH EVERY 2-4 HOURS AS NEEDED FOR HEADACHE; NO MORE THAN 2 A DAY OR 6 PER WEEK       Start Date: --        End Date: --    ERGOCALCIFEROL (ERGOCALCIFEROL) 50,000 UNIT CAP    Take 50,000 Units by mouth every 7 days.       Start Date: --        End Date: --    FAMOTIDINE (PEPCID) 20 MG TABLET    Take 1 tablet (20 mg  total) by mouth 2 (two) times daily as needed for Heartburn (and acid reflux not controlled with Pantoprazole).       Start Date: 1/25/2024 End Date: 1/24/2025    FEXOFENADINE (ALLEGRA) 60 MG TABLET    Take 60 mg by mouth once daily.       Start Date: --        End Date: --    FLUCONAZOLE (DIFLUCAN) 150 MG TAB    TAKE 1 TABLET BY MOUTH EVERY DAY FOR 1 DAY       Start Date: 2/10/2024 End Date: --    FLUTICASONE PROPIONATE (FLONASE) 50 MCG/ACTUATION NASAL SPRAY    2 sprays (100 mcg total) by Each Nostril route 2 (two) times daily.       Start Date: 3/12/2024 End Date: --    HALOBETASOL PROPIONATE (LEXETTE) 0.05 % FOAM           Start Date: --        End Date: --    HYDROXYZINE PAMOATE (VISTARIL) 25 MG CAP    TAKE ONE CAPSULE BY MOUTH THREE TIMES DAILY AS NEEDED FOR ITCHING       Start Date: --        End Date: --    HYOSCYAMINE (ANASPAZ,LEVSIN) 0.125 MG TAB           Start Date: --        End Date: --    LORATADINE (CLARITIN) 10 MG TABLET    Take 10 mg by mouth once daily.       Start Date: --        End Date: --    METOCLOPRAMIDE HCL (REGLAN) 10 MG TABLET    TK 1 T PO  UP TO TID PRN       Start Date: --        End Date: --    OMEPRAZOLE (PRILOSEC) 40 MG CAPSULE    Take 1 capsule every day by oral route.       Start Date: --        End Date: --    ONDANSETRON (ZOFRAN-ODT) 4 MG TBDL    Take 1 tablet (4 mg total) by mouth every 6 (six) hours as needed (nausea and vomiting).       Start Date: 1/25/2024 End Date: --    OPZELURA 1.5 % CREA    APPLY TO AFFECTED AREA TWICE DAILY FOLLOWED BY MOISTURIZER.       Start Date: --        End Date: --    OSELTAMIVIR (TAMIFLU) 75 MG CAPSULE    TAKE ONE CAPSULE BY MOUTH TWICE DAILY FOR 5 DAYS       Start Date: --        End Date: --    OXYCODONE-ACETAMINOPHEN (PERCOCET) 5-325 MG PER TABLET           Start Date: --        End Date: --    PANTOPRAZOLE (PROTONIX) 40 MG TABLET    Take 1 tablet (40 mg total) by mouth 2 (two) times daily.       Start Date: 1/25/2024 End Date: 1/24/2025     PHENAZOPYRIDINE (PYRIDIUM) 200 MG TABLET           Start Date: --        End Date: --    POLYETHYLENE GLYCOL (GAVILYTE-G) 236-22.74-6.74 -5.86 GRAM SUSPENSION           Start Date: --        End Date: --    PSEUDOEPHEDRINE (SUDAFED 12 HOUR) 120 MG 12 HR TABLET    Take 1 in AM during sinusitis episodes       Start Date: 3/12/2024 End Date: --    RIZATRIPTAN (MAXALT) 10 MG TABLET    Take 1 tablet as needed by oral route as needed for 30 days.       Start Date: --        End Date: --    SOOLANTRA 1 % CREA    APPLY TO FACE NIGHTLY       Start Date: --        End Date: --    SPIRONOLACTONE (ALDACTONE) 100 MG TABLET    Take 100 mg by mouth once daily.       Start Date: 8/1/2022  End Date: --    SPIRONOLACTONE (ALDACTONE) 25 MG TABLET    Take 25 mg by mouth once daily.       Start Date: 8/1/2022  End Date: --    SUCRALFATE (CARAFATE) 100 MG/ML SUSPENSION           Start Date: --        End Date: --    SULFAMETHOXAZOLE-TRIMETHOPRIM 800-160MG (BACTRIM DS) 800-160 MG TAB           Start Date: --        End Date: --    SUMATRIPTAN (IMITREX) 100 MG TABLET    Take 100 mg by mouth every 2 (two) hours as needed.       Start Date: --        End Date: --    SUMATRIPTAN 10 MG/ACTUATION SPRY    by Nasal route.       Start Date: --        End Date: --    TIZANIDINE (ZANAFLEX) 4 MG TABLET    Take 4 mg by mouth.       Start Date: 11/2/2023 End Date: --    TOBRAMYCIN-LOTEPRED (ZYLET) 0.3-0.5 % DRPS           Start Date: --        End Date: --    TRAMADOL (ULTRAM) 50 MG TABLET           Start Date: --        End Date: --    VENLAFAXINE (EFFEXOR-XR) 37.5 MG 24 HR CAPSULE           Start Date: --        End Date: --    ZORYVE 0.3 % FOAM    APPLY TO SCALP ONCE DAILY       Start Date: 2/16/2024 End Date: --         REVIEW OF SYSTEMS      CONST: no F/C/NS, no unintentional weight changes  NEURO:  no tremor, no weakness  EYES: no discharge, no erythema  EARS: no hearing loss, no sensation of fullness  PULM:  no SOB, no wheezing, no cough  CV: no  "CP, no palpitations  DERM: no rashes, no skin breaks    PHYSICAL EXAM     /86 (BP Location: Right arm, Patient Position: Sitting)   Pulse 72   Ht 5' 6" (1.676 m)   Wt 96.3 kg (212 lb 4.9 oz)   SpO2 99%   BMI 34.27 kg/m²   GEN: Awake and alert, no distress  DERM:  No flushing, no rashes  EYE:  No occular discharge, no redness  HEENT: No nasal discharge, no hoarseness, TMs are translucent bilaterally.  Nares are red.  Right nares is red and friable with few areas of pinpoint heme and severe turbinate swelling.  Oropharynx is benign without exudate.  Tongue is not coated.  NECK:  No LAD  PULM: Normal work of breathing, no cough,   NEURO:  No focal deficit, speech fluent and logical  PSYCH: appropriate affect, normal behavior      MEDICAL DECISION MAKING     Data reviewed (new entries in bold-face)      Allergy Testing      Inhalant ImmunoCAP essentially negative, 2024  Ragweed 0.29      Lab results      Total serum IgE 37, 2024  EO count 300, 2019    2 previous CBC's show elevated WBC around 15 K with increased granulocytes (2018, 2021).  Interval CBC (2019) unremarkable.    Normal IgA 6 (2018)     IgG 98, 160 (2018-19)     Imaging and other diagnostics      No chest x-ray available      Medical records review           Diagnoses:     Lou Lucio is a 39 y.o. female. with  1. Chronic rhinitis with negative immuno caps    2. Frequent episodes of sinusitis    3. Gastroesophageal reflux disease, unspecified whether esophagitis present    4. Hypertension, unspecified type    5. Anxiety            Plan:   Client is presenting with mild baseline rhinitis and frequent episodes of superimposed sinusitis.  Some of the sinusitis episodes are associated with fever and therefore probably infectious  (viral or bacterial).  Screening for allergies by the Immunocap method was unrevealing.  I recommend follow-up aeroallergen skin testing 04/15/2024.  Therapeutically, I recommend continuing present management with Flonase at " baseline and saline and Sudafed and Benadryl added at 1st sign of infection        Chronic rhinitis with negative immuno cap       -   follow-up with skin testing for/15/2024    Frequent episodes of sinusitis       -   Flonase as above       -   add Sudafed, saline nasal spray and Benadryl at 1st sign of infection    GERD --  likely contributing to some throat symptoms       Continue pantoprazole 40 mg twice daily    Comorbidities  Anxiety and HTN --  very judicious use of oral decongestants      PATIENT INSTRUCTIONS AND FOLLOW-UP     Patient Instructions       Continue same management    Follow-up for skin testing 04/15/2024        Niki Farah MD  Allergy, Asthma & Immunology    I spent a total of 32 minutes on the day of the visit.This includes face to face time and non-face to face time preparing to see the patient (eg, review of tests), obtaining and/or reviewing separately obtained history, documenting clinical information in the electronic or other health record, independently interpreting results and communicating results to the patient/family/caregiver, or care coordinator.

## 2024-04-09 ENCOUNTER — OFFICE VISIT (OUTPATIENT)
Dept: ALLERGY | Facility: CLINIC | Age: 39
End: 2024-04-09
Payer: COMMERCIAL

## 2024-04-09 VITALS
OXYGEN SATURATION: 99 % | HEIGHT: 66 IN | HEART RATE: 72 BPM | DIASTOLIC BLOOD PRESSURE: 86 MMHG | WEIGHT: 212.31 LBS | BODY MASS INDEX: 34.12 KG/M2 | SYSTOLIC BLOOD PRESSURE: 125 MMHG

## 2024-04-09 DIAGNOSIS — K21.9 GASTROESOPHAGEAL REFLUX DISEASE, UNSPECIFIED WHETHER ESOPHAGITIS PRESENT: ICD-10-CM

## 2024-04-09 DIAGNOSIS — J31.0 CHRONIC RHINITIS: Primary | ICD-10-CM

## 2024-04-09 DIAGNOSIS — F41.9 ANXIETY: ICD-10-CM

## 2024-04-09 DIAGNOSIS — J32.9 FREQUENT EPISODES OF SINUSITIS: ICD-10-CM

## 2024-04-09 DIAGNOSIS — I10 HYPERTENSION, UNSPECIFIED TYPE: ICD-10-CM

## 2024-04-09 PROCEDURE — 1159F MED LIST DOCD IN RCRD: CPT | Mod: CPTII,S$GLB,, | Performed by: STUDENT IN AN ORGANIZED HEALTH CARE EDUCATION/TRAINING PROGRAM

## 2024-04-09 PROCEDURE — 99214 OFFICE O/P EST MOD 30 MIN: CPT | Mod: S$GLB,,, | Performed by: STUDENT IN AN ORGANIZED HEALTH CARE EDUCATION/TRAINING PROGRAM

## 2024-04-09 PROCEDURE — 3074F SYST BP LT 130 MM HG: CPT | Mod: CPTII,S$GLB,, | Performed by: STUDENT IN AN ORGANIZED HEALTH CARE EDUCATION/TRAINING PROGRAM

## 2024-04-09 PROCEDURE — 3079F DIAST BP 80-89 MM HG: CPT | Mod: CPTII,S$GLB,, | Performed by: STUDENT IN AN ORGANIZED HEALTH CARE EDUCATION/TRAINING PROGRAM

## 2024-04-09 PROCEDURE — 1160F RVW MEDS BY RX/DR IN RCRD: CPT | Mod: CPTII,S$GLB,, | Performed by: STUDENT IN AN ORGANIZED HEALTH CARE EDUCATION/TRAINING PROGRAM

## 2024-04-09 PROCEDURE — 99999 PR PBB SHADOW E&M-EST. PATIENT-LVL V: CPT | Mod: PBBFAC,,, | Performed by: STUDENT IN AN ORGANIZED HEALTH CARE EDUCATION/TRAINING PROGRAM

## 2024-04-09 PROCEDURE — 3008F BODY MASS INDEX DOCD: CPT | Mod: CPTII,S$GLB,, | Performed by: STUDENT IN AN ORGANIZED HEALTH CARE EDUCATION/TRAINING PROGRAM

## 2024-04-15 ENCOUNTER — OFFICE VISIT (OUTPATIENT)
Dept: ALLERGY | Facility: CLINIC | Age: 39
End: 2024-04-15
Payer: COMMERCIAL

## 2024-04-15 VITALS — HEIGHT: 66 IN | BODY MASS INDEX: 34.12 KG/M2 | WEIGHT: 212.31 LBS

## 2024-04-15 DIAGNOSIS — K21.9 GASTROESOPHAGEAL REFLUX DISEASE, UNSPECIFIED WHETHER ESOPHAGITIS PRESENT: ICD-10-CM

## 2024-04-15 DIAGNOSIS — J32.9 FREQUENT EPISODES OF SINUSITIS: ICD-10-CM

## 2024-04-15 DIAGNOSIS — J30.0 CHRONIC VASOMOTOR RHINITIS: Primary | ICD-10-CM

## 2024-04-15 PROCEDURE — 1159F MED LIST DOCD IN RCRD: CPT | Mod: CPTII,S$GLB,, | Performed by: STUDENT IN AN ORGANIZED HEALTH CARE EDUCATION/TRAINING PROGRAM

## 2024-04-15 PROCEDURE — 3008F BODY MASS INDEX DOCD: CPT | Mod: CPTII,S$GLB,, | Performed by: STUDENT IN AN ORGANIZED HEALTH CARE EDUCATION/TRAINING PROGRAM

## 2024-04-15 PROCEDURE — 99999 PR PBB SHADOW E&M-EST. PATIENT-LVL V: CPT | Mod: PBBFAC,,, | Performed by: STUDENT IN AN ORGANIZED HEALTH CARE EDUCATION/TRAINING PROGRAM

## 2024-04-15 PROCEDURE — 1160F RVW MEDS BY RX/DR IN RCRD: CPT | Mod: CPTII,S$GLB,, | Performed by: STUDENT IN AN ORGANIZED HEALTH CARE EDUCATION/TRAINING PROGRAM

## 2024-04-15 PROCEDURE — 95004 PERQ TESTS W/ALRGNC XTRCS: CPT | Mod: S$GLB,,, | Performed by: STUDENT IN AN ORGANIZED HEALTH CARE EDUCATION/TRAINING PROGRAM

## 2024-04-15 PROCEDURE — 99212 OFFICE O/P EST SF 10 MIN: CPT | Mod: 25,S$GLB,, | Performed by: STUDENT IN AN ORGANIZED HEALTH CARE EDUCATION/TRAINING PROGRAM

## 2024-04-15 NOTE — PROGRESS NOTES
Allergy Clinic Note  [facility]    This note was created by combination of typed  and dictation. Transcription errors are likely.  If there are any questions, please contact me.    Subjective:      Patient ID: Lou Lucio is a 39 y.o. female.    Chief Complaint: Allergy Testing      Allergy problem list:    Chronic rhinitis  Frequent sinusitis    History of Present Illness: Lou Lucio is a 39 y.o. female who with chronic rhinitis and frequent sinusitis returns to follow up symptoms and assess triggers.  She is here alone, and she is a good historian    Related medications and other interventions  Pantaprozole 40 mg twice a day  Pepcid as needed  Flonase 2 squirts each nostril twice daily  Saline nasal spray as needed  Benadryl as needed  Sudafed as needed    4/15/2024:  Client reports increased sneezing off antihistamines.  Otherwise no significant change from last visit.  Skin testing positive for ragweed, which makes for poor correlation with her symptoms season.  Discussed vasomotor rhinitis briefly.  Recommended continuing Flonase with early treatment infections.  Follow up as needed.    04/09/2024:  Client's acute sinusitis resolved following last visit and she has not had any further sinusitis episodes.  On Flonase 2 squirts each nostril twice daily, she began to have some early warning symptoms.  Specifically she describes itching of her throat and note and lump of mucus in throat.   She started Benadryl to at HS saline spray and Sudafed.  Her symptoms have not escalated to a full-blown sinus infection.  She does report dry nose, thick nasal secretions, and an occasional nosebleed.  Physical exam was notable for bright red nares on the right with small amount heme.  Again reviewed negative immuno caps, which are surprising given her itching.  Plan follow-up skin testing 04/15/2024    3/12/2024:  At initial visit, linda reported near constant sinus infections  for the last 4 months  "( since December 2023).  She describes a mild baseline rhinitis consisting of sneezing and sensation of lots of mucus in her throat.  At the onset of sinusitis episodes she notices an increase in postnasal drip sensation and nasal discomfort that she describes as "like the feeling right before you sneeze. "   Symptoms during flares include cough, throat clearing, sore throat, hoarseness but no dysphagia.  She also has dry, stuffy nose with this sensation that she needs to sneeze and sometimes has nosebleeds.     Other symptoms include ear fullness and popping, runny nose, runny eyes, and itchingWhen symptoms are severe she finds it difficult to take a deep breath but denies cary air hunger.In addition she reports that her hand dermatitis worsens.  Her baseline medicines are antihistamine of choice and Flonase   One squirt at bedtime.  During sinusitis episodes She usually treats herself by swabbing Allegra-D for her morning antihistamine, adding Benadryl at HS and adding simply saline spray.  She is frequently treated with antibiotics at urgent care , including at present  She reported definite improvement on systemic steroids.  Her baseline medicines are antihistamine of choice and Flonase 1 squirt at bedtime.   She has never had allergy testing.     Aside from her frequent sinusitis, infection history is benign.  She has no history of pneumonia, otitis as an adult, or severe or unusual infections.  She did have a bout of  prolonged diarrhea from C diff which cleared rapidly after starting treatment.       MEDICAL HISTORY      Significant past medical history:  GERD, SHOBHA, IBS, migraines  Active problem list reviewed  ENT surgery:  None   Significant family history:  Father and brother with rhinitis.  Brother also has asthma and food anaphylaxis.  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 She has never been exposed to tobacco smoke. She has never used smokeless tobacco.    Meds: MAR reviewed    Asthma: No  Eczema:  Yes.  On hands describes symptoms of dyshidrotic eczema.  Also reports intermittent dry patch or sometimes plaque on forehead.  Rhinitis: Yes.  See HPI  Drug allergy/intolerance: NKDA  Venom allergy:  No  Latex allergy:  No    Patient Active Problem List   Diagnosis    Diarrhea    Dysphagia    Rumination syndrome of ingested food in adult    Bell's palsy    Gastroesophageal reflux disease without esophagitis    Hypertension    Migraine with aura    Vitamin B deficiency    Vitamin D deficiency     Medication List with Changes/Refills   Current Medications    AMITRIPTYLINE (ELAVIL) 10 MG TABLET           Start Date: --        End Date: --    AMITRIPTYLINE (ELAVIL) 50 MG TABLET           Start Date: --        End Date: --    AMOXICILLIN (AMOXIL) 500 MG CAPSULE           Start Date: --        End Date: --    AMOXICILLIN-CLAVULANATE 875-125MG (AUGMENTIN) 875-125 MG PER TABLET           Start Date: --        End Date: --    AZITHROMYCIN (Z-NEETU) 250 MG TABLET           Start Date: --        End Date: --    BYSTOLIC 5 MG TAB    TK 1 T PO QD IN THE EMEKA       Start Date: 3/26/2018 End Date: --    CEFDINIR (OMNICEF) 300 MG CAPSULE           Start Date: --        End Date: --    CELECOXIB (CELEBREX) 100 MG CAPSULE           Start Date: --        End Date: --    CEPHALEXIN (KEFLEX) 500 MG CAPSULE           Start Date: --        End Date: --    CETIRIZINE (ZYRTEC) 10 MG TABLET    Take 10 mg by mouth once daily.       Start Date: --        End Date: --    CLINDAMYCIN (CLEOCIN) 300 MG CAPSULE           Start Date: --        End Date: --    CYCLOSPORINE (RESTASIS) 0.05 % OPHTHALMIC EMULSION           Start Date: --        End Date: --    DESOXIMETASONE 0.25 % OINTMENT    1 application  2 (two) times daily.       Start Date: --        End Date: --    ELETRIPTAN (RELPAX) 40 MG TABLET            Start Date: --        End Date: --    ERGOCALCIFEROL (ERGOCALCIFEROL) 50,000 UNIT CAP    Take 50,000 Units by mouth every 7 days.       Start Date: --        End Date: --    FAMOTIDINE (PEPCID) 20 MG TABLET    Take 1 tablet (20 mg total) by mouth 2 (two) times daily as needed for Heartburn (and acid reflux not controlled with Pantoprazole).       Start Date: 1/25/2024 End Date: 1/24/2025    FEXOFENADINE (ALLEGRA) 60 MG TABLET    Take 60 mg by mouth once daily.       Start Date: --        End Date: --    FLUCONAZOLE (DIFLUCAN) 150 MG TAB           Start Date: 2/10/2024 End Date: --    FLUTICASONE PROPIONATE (FLONASE) 50 MCG/ACTUATION NASAL SPRAY    2 sprays (100 mcg total) by Each Nostril route 2 (two) times daily.       Start Date: 3/12/2024 End Date: --    HALOBETASOL PROPIONATE (LEXETTE) 0.05 % FOAM           Start Date: --        End Date: --    HYDROXYZINE PAMOATE (VISTARIL) 25 MG CAP           Start Date: --        End Date: --    HYOSCYAMINE (ANASPAZ,LEVSIN) 0.125 MG TAB           Start Date: --        End Date: --    LORATADINE (CLARITIN) 10 MG TABLET    Take 10 mg by mouth once daily.       Start Date: --        End Date: --    METOCLOPRAMIDE HCL (REGLAN) 10 MG TABLET           Start Date: --        End Date: --    OMEPRAZOLE (PRILOSEC) 40 MG CAPSULE           Start Date: --        End Date: --    ONDANSETRON (ZOFRAN-ODT) 4 MG TBDL    Take 1 tablet (4 mg total) by mouth every 6 (six) hours as needed (nausea and vomiting).       Start Date: 1/25/2024 End Date: --    OPZELURA 1.5 % CREA    APPLY TO AFFECTED AREA TWICE DAILY FOLLOWED BY MOISTURIZER.       Start Date: --        End Date: --    OSELTAMIVIR (TAMIFLU) 75 MG CAPSULE           Start Date: --        End Date: --    OXYCODONE-ACETAMINOPHEN (PERCOCET) 5-325 MG PER TABLET           Start Date: --        End Date: --    PANTOPRAZOLE (PROTONIX) 40 MG TABLET    Take 1 tablet (40 mg total) by mouth 2 (two) times daily.       Start Date: 1/25/2024 End Date:  1/24/2025    PHENAZOPYRIDINE (PYRIDIUM) 200 MG TABLET           Start Date: --        End Date: --    POLYETHYLENE GLYCOL (GAVILYTE-G) 236-22.74-6.74 -5.86 GRAM SUSPENSION           Start Date: --        End Date: --    PSEUDOEPHEDRINE (SUDAFED 12 HOUR) 120 MG 12 HR TABLET    Take 1 in AM during sinusitis episodes       Start Date: 3/12/2024 End Date: --    RIZATRIPTAN (MAXALT) 10 MG TABLET           Start Date: --        End Date: --    SOOLANTRA 1 % CREA    APPLY TO FACE NIGHTLY       Start Date: --        End Date: --    SPIRONOLACTONE (ALDACTONE) 100 MG TABLET    Take 100 mg by mouth once daily.       Start Date: 8/1/2022  End Date: --    SPIRONOLACTONE (ALDACTONE) 25 MG TABLET    Take 25 mg by mouth once daily.       Start Date: 8/1/2022  End Date: --    SUCRALFATE (CARAFATE) 100 MG/ML SUSPENSION           Start Date: --        End Date: --    SULFAMETHOXAZOLE-TRIMETHOPRIM 800-160MG (BACTRIM DS) 800-160 MG TAB           Start Date: --        End Date: --    SUMATRIPTAN (IMITREX) 100 MG TABLET    Take 100 mg by mouth every 2 (two) hours as needed.       Start Date: --        End Date: --    SUMATRIPTAN 10 MG/ACTUATION SPRY    by Nasal route.       Start Date: --        End Date: --    TIZANIDINE (ZANAFLEX) 4 MG TABLET    Take 4 mg by mouth.       Start Date: 11/2/2023 End Date: --    TOBRAMYCIN-LOTEPRED (ZYLET) 0.3-0.5 % DRPS           Start Date: --        End Date: --    TRAMADOL (ULTRAM) 50 MG TABLET           Start Date: --        End Date: --    VENLAFAXINE (EFFEXOR-XR) 37.5 MG 24 HR CAPSULE           Start Date: --        End Date: --    ZORYVE 0.3 % FOAM    APPLY TO SCALP ONCE DAILY       Start Date: 2/16/2024 End Date: --         REVIEW OF SYSTEMS      CONST: no F/C/NS, no unintentional weight changes  NEURO:  no tremor, no weakness  EYES: no discharge, no erythema  EARS: no hearing loss, no sensation of fullness  PULM:  no SOB, no wheezing, no cough  CV: no CP, no palpitations  DERM: no rashes, no skin  "breaks    PHYSICAL EXAM     Ht 5' 6" (1.676 m)   Wt 96.3 kg (212 lb 4.9 oz)   BMI 34.27 kg/m²   GEN: Awake and alert, no distress  DERM:  No flushing, no rashes  EYE:  No occular discharge, no redness  HEENT: No nasal discharge, no hoarseness,     PULM: Normal work of breathing, no cough, CTA  COR:  RRR, normal capillary refill  NEURO:  No focal deficit, speech fluent and logical  PSYCH: appropriate affect, normal behavior        MEDICAL DECISION MAKING     Data reviewed (new entries in bold-face)      Allergy Testing      Aeroallergen skin testing by the modified prick method (04/15/2024) positive for ragweed with appropriate positive and negative controls.    Inhalant ImmunoCAP essentially negative, 2024  Ragweed 0.29      Lab results      Total serum IgE 37, 2024  EO count 300, 2019    2 previous CBC's show elevated WBC around 15 K with increased granulocytes (2018, 2021).  Interval CBC (2019) unremarkable.    Normal IgA 6 (2018)     IgG 98, 160 (2018-19)     Imaging and other diagnostics      No chest x-ray available    Normal esophagram, 2019  Normal modified barium swallow, 2019      Medical records review           Diagnoses:     Lou Lucio is a 39 y.o. female. with  1. Chronic vasomotor rhinitis    2. Frequent episodes of nonbacterial sinusitis    3. Gastroesophageal reflux disease, unspecified whether esophagitis present      Plan:   Client is presenting with mild baseline rhinitis and frequent episodes of superimposed sinusitis.  Some of the sinusitis episodes are associated with fever and therefore probably infectious  (viral or bacterial).  Skin testing for allergies yielded only ragweed which corresponds poorly to her symptoms season.  I think the bulk of her symptoms are nonallergic in nature.  Discussed pathogenesis and triggers of vasomotor rhinitis briefly.  Recommended continuing Sudafed on a regular basis and adding antihistamines and decongestants at 1st sign of infection.  Follow up as " needed.      mixed rhinitis, mostly chronic vasomotor rhinitis       Continue Flonase    Frequent episodes of sinusitis       -   Flonase as above       -   add Sudafed, saline nasal spray and Benadryl at 1st sign of infection    GERD --  likely contributing to some throat symptoms       Continue pantoprazole 40 mg twice daily    Comorbidities  Anxiety and HTN --  very judicious use of oral decongestants      PATIENT INSTRUCTIONS AND FOLLOW-UP     Patient Instructions   Allergic only to ragweed pollen, which is present in the air late summer and early fall only.  I think the bulk of your symptoms are due to vasomotor rhinitis    Recommend    Continue Flonase    Add Benadryl and pseudoephedrine as needed    Follow-up as needed        Niki Farah MD  Allergy, Asthma & Immunology    I spent a total of 16 minutes on the day of the visit, excluding time for skin test preparation, reading, and interpretation.  This includes face to face time and non-face to face time preparing to see the patient (eg, review of tests), obtaining and/or reviewing separately obtained history, documenting clinical information in the electronic or other health record, independently interpreting results and communicating results to the patient/family/caregiver, or care coordinator.

## 2024-04-15 NOTE — PATIENT INSTRUCTIONS
Allergic only to ragweed pollen, which is present in the air late summer and early fall only.  I think the bulk of your symptoms are due to vasomotor rhinitis    Recommend    Continue Flonase    Add Benadryl and pseudoephedrine as needed    Follow-up as needed

## 2024-09-24 ENCOUNTER — DOCUMENTATION ONLY (OUTPATIENT)
Dept: PHARMACY | Facility: CLINIC | Age: 39
End: 2024-09-24
Payer: COMMERCIAL

## 2024-09-24 NOTE — PROGRESS NOTES
[id number] APPROVED UNTIL 9/23/2025  PANTOPRAZOLE (PROTONIX) 40MG    PEPE GUERRERO CPhT  M.A.S.

## 2024-10-16 DIAGNOSIS — J31.0 CHRONIC RHINITIS: ICD-10-CM

## 2024-10-16 RX ORDER — FLUTICASONE PROPIONATE 50 MCG
2 SPRAY, SUSPENSION (ML) NASAL 2 TIMES DAILY
Qty: 31.6 ML | Refills: 5 | Status: SHIPPED | OUTPATIENT
Start: 2024-10-16

## 2025-01-15 ENCOUNTER — OFFICE VISIT (OUTPATIENT)
Dept: GASTROENTEROLOGY | Facility: CLINIC | Age: 40
End: 2025-01-15
Payer: COMMERCIAL

## 2025-01-15 VITALS
BODY MASS INDEX: 33.66 KG/M2 | HEIGHT: 66 IN | SYSTOLIC BLOOD PRESSURE: 135 MMHG | DIASTOLIC BLOOD PRESSURE: 85 MMHG | WEIGHT: 209.44 LBS | HEART RATE: 83 BPM

## 2025-01-15 DIAGNOSIS — K21.9 GASTROESOPHAGEAL REFLUX DISEASE, UNSPECIFIED WHETHER ESOPHAGITIS PRESENT: ICD-10-CM

## 2025-01-15 DIAGNOSIS — R11.0 NAUSEA: ICD-10-CM

## 2025-01-15 PROCEDURE — 99213 OFFICE O/P EST LOW 20 MIN: CPT | Mod: S$GLB,,, | Performed by: INTERNAL MEDICINE

## 2025-01-15 PROCEDURE — 3008F BODY MASS INDEX DOCD: CPT | Mod: CPTII,S$GLB,, | Performed by: INTERNAL MEDICINE

## 2025-01-15 PROCEDURE — 1159F MED LIST DOCD IN RCRD: CPT | Mod: CPTII,S$GLB,, | Performed by: INTERNAL MEDICINE

## 2025-01-15 PROCEDURE — 99999 PR PBB SHADOW E&M-EST. PATIENT-LVL IV: CPT | Mod: PBBFAC,,, | Performed by: INTERNAL MEDICINE

## 2025-01-15 PROCEDURE — 3075F SYST BP GE 130 - 139MM HG: CPT | Mod: CPTII,S$GLB,, | Performed by: INTERNAL MEDICINE

## 2025-01-15 PROCEDURE — 3079F DIAST BP 80-89 MM HG: CPT | Mod: CPTII,S$GLB,, | Performed by: INTERNAL MEDICINE

## 2025-01-15 RX ORDER — ONDANSETRON 4 MG/1
4 TABLET, ORALLY DISINTEGRATING ORAL EVERY 6 HOURS PRN
Qty: 30 TABLET | Refills: 11 | Status: SHIPPED | OUTPATIENT
Start: 2025-01-15

## 2025-01-15 RX ORDER — FAMOTIDINE 20 MG/1
20 TABLET, FILM COATED ORAL 2 TIMES DAILY PRN
Qty: 60 TABLET | Refills: 11 | Status: SHIPPED | OUTPATIENT
Start: 2025-01-15 | End: 2026-01-15

## 2025-01-15 RX ORDER — PANTOPRAZOLE SODIUM 40 MG/1
40 TABLET, DELAYED RELEASE ORAL 2 TIMES DAILY
Qty: 60 TABLET | Refills: 11 | Status: SHIPPED | OUTPATIENT
Start: 2025-01-15 | End: 2026-01-15

## 2025-01-15 NOTE — OR NURSING
Impedance catheter removed from left naris @ 34cm. Tegaderm sites to left cheek and left lateral neck  WDL after tegaderm removal. Patient  aware of nasal soreness after removal is likely and could last for several days. Tolerated 24 hour study well per patient report but reported unpleasant sinus / nasal sensations throughout study. Moderate catheter tension and discomfort caused from accidental pulling of catheter by nurse just prior to catheter removal to area behind left ear. Assessed and WDL. Catheter then removed from naris with no complaints.   
Negative

## 2025-01-15 NOTE — PROGRESS NOTES
[facility]    CC: heartburn    HPI 39 y.o. female with past medical history of rumination who presents with chronic, well controlled, non bothersome heartburn/GERD with rare nausea.    Overall she feels her symptoms have improved over the past year.  Her GERD is well controlled with Pantoprazole 40mg twice per day.  She takes PRN famotidine from time to time.  She has needed zofran even less for nausea.    Past Medical History  Past Medical History:   Diagnosis Date    Anxiety     C. difficile colitis     Eczema     GERD (gastroesophageal reflux disease)     Hx of psychiatric care     Hypertension     Irritable bowel syndrome     Migraines     Psychiatric problem     Therapy        Physical Examination  /85   Pulse 83   Ht 5' 6" (1.676 m)   Wt 95 kg (209 lb 7 oz)   BMI 33.80 kg/m²   General appearance: alert, cooperative, no distress  HENT: Normocephalic, atraumatic, neck symmetrical, no nasal discharge   Abdomen: soft, non-tender; non distended, no rebound or guarding  Neurologic: Alert and oriented X 3, moving all four extremities, intact sensation to light touch    Labs:  Lab Results   Component Value Date    WBC 14.52 (H) 11/03/2021    HGB 14.4 11/03/2021    HCT 43.6 11/03/2021    MCV 92 11/03/2021     (H) 11/03/2021         CMP  Sodium   Date Value Ref Range Status   05/09/2024 138 135 - 146 mmol/L Final   11/03/2021 140 136 - 145 mmol/L Final     Potassium   Date Value Ref Range Status   05/09/2024 4.3 3.5 - 5.3 mmol/L Final   11/03/2021 4.0 3.5 - 5.1 mmol/L Final     Chloride   Date Value Ref Range Status   11/03/2021 105 95 - 110 mmol/L Final     CO2   Date Value Ref Range Status   11/03/2021 22 (L) 23 - 29 mmol/L Final     Carbon Dioxide   Date Value Ref Range Status   05/09/2024 21 20 - 32 mmol/L Final     Glucose   Date Value Ref Range Status   05/09/2024 85 65 - 99 mg/dL Final     Comment:                  Fasting reference interval   11/03/2021 75 70 - 110 mg/dL Final "     BUN   Date Value Ref Range Status   11/03/2021 13 6 - 20 mg/dL Final     Blood Urea Nitrogen   Date Value Ref Range Status   05/09/2024 12 7 - 25 mg/dL Final     Creatinine   Date Value Ref Range Status   05/09/2024 0.90 0.50 - 0.97 mg/dL Final   11/03/2021 0.9 0.5 - 1.4 mg/dL Final     Calcium   Date Value Ref Range Status   05/09/2024 9.6 8.6 - 10.2 mg/dL Final   11/03/2021 10.0 8.7 - 10.5 mg/dL Final     Total Protein   Date Value Ref Range Status   11/03/2021 7.7 6.0 - 8.4 g/dL Final     Albumin   Date Value Ref Range Status   11/03/2021 4.3 3.5 - 5.2 g/dL Final     Albumin Level   Date Value Ref Range Status   05/09/2024 4.6 3.6 - 5.1 g/dL Final     Total Bilirubin   Date Value Ref Range Status   05/09/2024 0.5 0.2 - 1.2 mg/dL Final   11/03/2021 0.4 0.1 - 1.0 mg/dL Final     Comment:     For infants and newborns, interpretation of results should be based  on gestational age, weight and in agreement with clinical  observations.    Premature Infant recommended reference ranges:  Up to 24 hours.............<8.0 mg/dL  Up to 48 hours............<12.0 mg/dL  3-5 days..................<15.0 mg/dL  6-29 days.................<15.0 mg/dL       Alkaline Phosphatase   Date Value Ref Range Status   05/09/2024 84 31 - 125 U/L Final   11/03/2021 106 55 - 135 U/L Final     AST   Date Value Ref Range Status   05/09/2024 28 10 - 30 U/L Final   11/03/2021 20 10 - 40 U/L Final     ALT   Date Value Ref Range Status   05/09/2024 29 6 - 29 U/L Final   11/03/2021 23 10 - 44 U/L Final     Anion Gap   Date Value Ref Range Status   11/03/2021 13 8 - 16 mmol/L Final     eGFR   Date Value Ref Range Status   05/09/2024 83 > OR = 60 mL/min/1.73m2 Final         Assessment:   1. Gastroesophageal reflux disease, unspecified whether esophagitis present    2. Nausea      The patient is a 40 yo female with GERD and rumination with well controlled symptoms.    Plan:  -Pantoprazole 40mg twice per day, famotidine PRN and zofran PRN refilled  today.    Annia Johnston MD

## 2025-02-18 ENCOUNTER — TELEPHONE (OUTPATIENT)
Dept: GASTROENTEROLOGY | Facility: CLINIC | Age: 40
End: 2025-02-18
Payer: COMMERCIAL

## 2025-02-18 ENCOUNTER — PATIENT MESSAGE (OUTPATIENT)
Dept: GASTROENTEROLOGY | Facility: CLINIC | Age: 40
End: 2025-02-18
Payer: COMMERCIAL

## 2025-02-18 DIAGNOSIS — R19.7 DIARRHEA, UNSPECIFIED TYPE: Primary | ICD-10-CM

## 2025-02-18 NOTE — TELEPHONE ENCOUNTER
----- Message from Annia Johnston MD sent at 2/18/2025  1:06 PM CST -----  Regarding: Stool kit  Can you prepare a stool kit for the patient and either mail it to her or have her pick it up?Thanks!Annia